# Patient Record
Sex: FEMALE | Race: BLACK OR AFRICAN AMERICAN | NOT HISPANIC OR LATINO | Employment: OTHER | URBAN - METROPOLITAN AREA
[De-identification: names, ages, dates, MRNs, and addresses within clinical notes are randomized per-mention and may not be internally consistent; named-entity substitution may affect disease eponyms.]

---

## 2018-06-17 ENCOUNTER — HOSPITAL ENCOUNTER (INPATIENT)
Facility: HOSPITAL | Age: 68
LOS: 2 days | Discharge: HOME | End: 2018-06-19
Attending: EMERGENCY MEDICINE | Admitting: HOSPITALIST

## 2018-06-17 ENCOUNTER — APPOINTMENT (OUTPATIENT)
Dept: GENERAL RADIOLOGY | Facility: HOSPITAL | Age: 68
End: 2018-06-17

## 2018-06-17 DIAGNOSIS — J02.0 STREP PHARYNGITIS: Primary | ICD-10-CM

## 2018-06-17 DIAGNOSIS — E87.1 HYPONATREMIA: ICD-10-CM

## 2018-06-17 DIAGNOSIS — R73.9 HYPERGLYCEMIA: ICD-10-CM

## 2018-06-17 DIAGNOSIS — A41.9 SEPSIS, DUE TO UNSPECIFIED ORGANISM: ICD-10-CM

## 2018-06-17 PROBLEM — E11.65 TYPE 2 DIABETES MELLITUS WITH HYPERGLYCEMIA (HCC): Status: ACTIVE | Noted: 2018-06-17

## 2018-06-17 PROBLEM — E86.0 DEHYDRATION: Status: ACTIVE | Noted: 2018-06-17

## 2018-06-17 PROBLEM — A40.0 SEPSIS DUE TO GROUP A STREPTOCOCCUS (HCC): Status: ACTIVE | Noted: 2018-06-17

## 2018-06-17 PROBLEM — I10 ESSENTIAL HYPERTENSION: Status: ACTIVE | Noted: 2018-06-17

## 2018-06-17 PROBLEM — A40.9 STREPTOCOCCAL SEPSIS (HCC): Status: ACTIVE | Noted: 2018-06-17

## 2018-06-17 LAB
ALBUMIN SERPL-MCNC: 4.5 G/DL (ref 3.5–5.2)
ALBUMIN/GLOB SERPL: 0.9 G/DL
ALP SERPL-CCNC: 92 U/L (ref 39–117)
ALT SERPL W P-5'-P-CCNC: 15 U/L (ref 1–33)
ANION GAP SERPL CALCULATED.3IONS-SCNC: 16 MMOL/L
AST SERPL-CCNC: 13 U/L (ref 1–32)
BACTERIA UR QL AUTO: ABNORMAL /HPF
BASOPHILS # BLD AUTO: 0.02 10*3/MM3 (ref 0–0.2)
BASOPHILS NFR BLD AUTO: 0.1 % (ref 0–1.5)
BILIRUB SERPL-MCNC: 0.4 MG/DL (ref 0.1–1.2)
BILIRUB UR QL STRIP: NEGATIVE
BUN BLD-MCNC: 13 MG/DL (ref 8–23)
BUN/CREAT SERPL: 11.7 (ref 7–25)
CALCIUM SPEC-SCNC: 10.6 MG/DL (ref 8.6–10.5)
CHLORIDE SERPL-SCNC: 82 MMOL/L (ref 98–107)
CLARITY UR: ABNORMAL
CO2 SERPL-SCNC: 24 MMOL/L (ref 22–29)
COLOR UR: ABNORMAL
CREAT BLD-MCNC: 1.11 MG/DL (ref 0.57–1)
D-LACTATE SERPL-SCNC: 1.7 MMOL/L (ref 0.5–2)
DEPRECATED RDW RBC AUTO: 48.2 FL (ref 37–54)
EOSINOPHIL # BLD AUTO: 0 10*3/MM3 (ref 0–0.7)
EOSINOPHIL NFR BLD AUTO: 0 % (ref 0.3–6.2)
ERYTHROCYTE [DISTWIDTH] IN BLOOD BY AUTOMATED COUNT: 15.9 % (ref 11.7–13)
GFR SERPL CREATININE-BSD FRML MDRD: 59 ML/MIN/1.73
GLOBULIN UR ELPH-MCNC: 5.1 GM/DL
GLUCOSE BLD-MCNC: 336 MG/DL (ref 65–99)
GLUCOSE UR STRIP-MCNC: ABNORMAL MG/DL
HCT VFR BLD AUTO: 43.1 % (ref 35.6–45.5)
HGB BLD-MCNC: 14 G/DL (ref 11.9–15.5)
HGB UR QL STRIP.AUTO: NEGATIVE
HOLD SPECIMEN: NORMAL
HOLD SPECIMEN: NORMAL
HYALINE CASTS UR QL AUTO: ABNORMAL /LPF
IMM GRANULOCYTES # BLD: 0.04 10*3/MM3 (ref 0–0.03)
IMM GRANULOCYTES NFR BLD: 0.2 % (ref 0–0.5)
KETONES UR QL STRIP: ABNORMAL
LEUKOCYTE ESTERASE UR QL STRIP.AUTO: ABNORMAL
LYMPHOCYTES # BLD AUTO: 1.4 10*3/MM3 (ref 0.9–4.8)
LYMPHOCYTES NFR BLD AUTO: 7.5 % (ref 19.6–45.3)
MCH RBC QN AUTO: 26.9 PG (ref 26.9–32)
MCHC RBC AUTO-ENTMCNC: 32.5 G/DL (ref 32.4–36.3)
MCV RBC AUTO: 82.9 FL (ref 80.5–98.2)
MONOCYTES # BLD AUTO: 0.64 10*3/MM3 (ref 0.2–1.2)
MONOCYTES NFR BLD AUTO: 3.4 % (ref 5–12)
NEUTROPHILS # BLD AUTO: 16.56 10*3/MM3 (ref 1.9–8.1)
NEUTROPHILS NFR BLD AUTO: 88.8 % (ref 42.7–76)
NITRITE UR QL STRIP: NEGATIVE
OSMOLALITY SERPL: 298 MOSM/KG (ref 280–301)
OSMOLALITY UR: 547 MOSM/KG
PH UR STRIP.AUTO: <=5 [PH] (ref 5–8)
PLATELET # BLD AUTO: 246 10*3/MM3 (ref 140–500)
PMV BLD AUTO: 10.8 FL (ref 6–12)
POTASSIUM BLD-SCNC: 4.7 MMOL/L (ref 3.5–5.2)
PROT SERPL-MCNC: 9.6 G/DL (ref 6–8.5)
PROT UR QL STRIP: ABNORMAL
RBC # BLD AUTO: 5.2 10*6/MM3 (ref 3.9–5.2)
RBC # UR: ABNORMAL /HPF
REF LAB TEST METHOD: ABNORMAL
S PYO AG THROAT QL: POSITIVE
SODIUM BLD-SCNC: 122 MMOL/L (ref 136–145)
SP GR UR STRIP: >=1.03 (ref 1–1.03)
SQUAMOUS #/AREA URNS HPF: ABNORMAL /HPF
TRANS CELLS #/AREA URNS HPF: ABNORMAL /HPF
URATE SERPL-MCNC: 6 MG/DL (ref 2.4–5.7)
UROBILINOGEN UR QL STRIP: ABNORMAL
WBC NRBC COR # BLD: 18.66 10*3/MM3 (ref 4.5–10.7)
WBC UR QL AUTO: ABNORMAL /HPF
WHOLE BLOOD HOLD SPECIMEN: NORMAL
WHOLE BLOOD HOLD SPECIMEN: NORMAL

## 2018-06-17 PROCEDURE — 87880 STREP A ASSAY W/OPTIC: CPT | Performed by: EMERGENCY MEDICINE

## 2018-06-17 PROCEDURE — 87040 BLOOD CULTURE FOR BACTERIA: CPT

## 2018-06-17 PROCEDURE — 71046 X-RAY EXAM CHEST 2 VIEWS: CPT

## 2018-06-17 PROCEDURE — 80053 COMPREHEN METABOLIC PANEL: CPT

## 2018-06-17 PROCEDURE — 25010000002 PIPERACILLIN SOD-TAZOBACTAM PER 1 G: Performed by: EMERGENCY MEDICINE

## 2018-06-17 PROCEDURE — 87486 CHLMYD PNEUM DNA AMP PROBE: CPT | Performed by: INTERNAL MEDICINE

## 2018-06-17 PROCEDURE — 36415 COLL VENOUS BLD VENIPUNCTURE: CPT

## 2018-06-17 PROCEDURE — 83605 ASSAY OF LACTIC ACID: CPT

## 2018-06-17 PROCEDURE — 83036 HEMOGLOBIN GLYCOSYLATED A1C: CPT | Performed by: INTERNAL MEDICINE

## 2018-06-17 PROCEDURE — 83935 ASSAY OF URINE OSMOLALITY: CPT | Performed by: EMERGENCY MEDICINE

## 2018-06-17 PROCEDURE — 83930 ASSAY OF BLOOD OSMOLALITY: CPT | Performed by: EMERGENCY MEDICINE

## 2018-06-17 PROCEDURE — 87633 RESP VIRUS 12-25 TARGETS: CPT | Performed by: INTERNAL MEDICINE

## 2018-06-17 PROCEDURE — 87040 BLOOD CULTURE FOR BACTERIA: CPT | Performed by: EMERGENCY MEDICINE

## 2018-06-17 PROCEDURE — 87086 URINE CULTURE/COLONY COUNT: CPT | Performed by: EMERGENCY MEDICINE

## 2018-06-17 PROCEDURE — 87581 M.PNEUMON DNA AMP PROBE: CPT | Performed by: INTERNAL MEDICINE

## 2018-06-17 PROCEDURE — 99285 EMERGENCY DEPT VISIT HI MDM: CPT

## 2018-06-17 PROCEDURE — 87798 DETECT AGENT NOS DNA AMP: CPT | Performed by: INTERNAL MEDICINE

## 2018-06-17 PROCEDURE — 81001 URINALYSIS AUTO W/SCOPE: CPT | Performed by: EMERGENCY MEDICINE

## 2018-06-17 PROCEDURE — 85025 COMPLETE CBC W/AUTO DIFF WBC: CPT

## 2018-06-17 PROCEDURE — 84550 ASSAY OF BLOOD/URIC ACID: CPT | Performed by: EMERGENCY MEDICINE

## 2018-06-17 RX ORDER — DEXTROSE MONOHYDRATE 25 G/50ML
25 INJECTION, SOLUTION INTRAVENOUS
Status: DISCONTINUED | OUTPATIENT
Start: 2018-06-17 | End: 2018-06-19 | Stop reason: HOSPADM

## 2018-06-17 RX ORDER — ONDANSETRON 2 MG/ML
4 INJECTION INTRAMUSCULAR; INTRAVENOUS EVERY 6 HOURS PRN
Status: DISCONTINUED | OUTPATIENT
Start: 2018-06-17 | End: 2018-06-19 | Stop reason: HOSPADM

## 2018-06-17 RX ORDER — NICOTINE POLACRILEX 4 MG
15 LOZENGE BUCCAL
Status: DISCONTINUED | OUTPATIENT
Start: 2018-06-17 | End: 2018-06-19 | Stop reason: HOSPADM

## 2018-06-17 RX ORDER — SODIUM CHLORIDE 0.9 % (FLUSH) 0.9 %
10 SYRINGE (ML) INJECTION AS NEEDED
Status: DISCONTINUED | OUTPATIENT
Start: 2018-06-17 | End: 2018-06-19 | Stop reason: HOSPADM

## 2018-06-17 RX ORDER — ONDANSETRON 4 MG/1
4 TABLET, FILM COATED ORAL EVERY 6 HOURS PRN
Status: DISCONTINUED | OUTPATIENT
Start: 2018-06-17 | End: 2018-06-19 | Stop reason: HOSPADM

## 2018-06-17 RX ORDER — ACETAMINOPHEN 500 MG
1000 TABLET ORAL ONCE
Status: COMPLETED | OUTPATIENT
Start: 2018-06-17 | End: 2018-06-17

## 2018-06-17 RX ORDER — CEFTRIAXONE SODIUM 2 G/50ML
2 INJECTION, SOLUTION INTRAVENOUS EVERY 24 HOURS
Status: DISCONTINUED | OUTPATIENT
Start: 2018-06-17 | End: 2018-06-19 | Stop reason: HOSPADM

## 2018-06-17 RX ORDER — SODIUM CHLORIDE 0.9 % (FLUSH) 0.9 %
1-10 SYRINGE (ML) INJECTION AS NEEDED
Status: DISCONTINUED | OUTPATIENT
Start: 2018-06-17 | End: 2018-06-19 | Stop reason: HOSPADM

## 2018-06-17 RX ORDER — SODIUM CHLORIDE 9 MG/ML
100 INJECTION, SOLUTION INTRAVENOUS CONTINUOUS
Status: DISCONTINUED | OUTPATIENT
Start: 2018-06-17 | End: 2018-06-19 | Stop reason: HOSPADM

## 2018-06-17 RX ORDER — CALCIUM CARBONATE 200(500)MG
2 TABLET,CHEWABLE ORAL 2 TIMES DAILY PRN
Status: DISCONTINUED | OUTPATIENT
Start: 2018-06-17 | End: 2018-06-19 | Stop reason: HOSPADM

## 2018-06-17 RX ORDER — ACETAMINOPHEN 325 MG/1
650 TABLET ORAL EVERY 4 HOURS PRN
Status: DISCONTINUED | OUTPATIENT
Start: 2018-06-17 | End: 2018-06-19 | Stop reason: HOSPADM

## 2018-06-17 RX ORDER — BISACODYL 5 MG/1
5 TABLET, DELAYED RELEASE ORAL DAILY PRN
Status: DISCONTINUED | OUTPATIENT
Start: 2018-06-17 | End: 2018-06-19 | Stop reason: HOSPADM

## 2018-06-17 RX ORDER — ONDANSETRON 4 MG/1
4 TABLET, ORALLY DISINTEGRATING ORAL EVERY 6 HOURS PRN
Status: DISCONTINUED | OUTPATIENT
Start: 2018-06-17 | End: 2018-06-19 | Stop reason: HOSPADM

## 2018-06-17 RX ADMIN — SODIUM CHLORIDE 500 ML: 9 INJECTION, SOLUTION INTRAVENOUS at 21:33

## 2018-06-17 RX ADMIN — SODIUM CHLORIDE 125 ML/HR: 9 INJECTION, SOLUTION INTRAVENOUS at 22:33

## 2018-06-17 RX ADMIN — ACETAMINOPHEN 1000 MG: 500 TABLET ORAL at 18:42

## 2018-06-17 RX ADMIN — TAZOBACTAM SODIUM AND PIPERACILLIN SODIUM 4.5 G: 500; 4 INJECTION, SOLUTION INTRAVENOUS at 19:57

## 2018-06-17 RX ADMIN — SODIUM CHLORIDE 1000 ML: 9 INJECTION, SOLUTION INTRAVENOUS at 19:43

## 2018-06-17 NOTE — ED PROVIDER NOTES
EMERGENCY DEPARTMENT ENCOUNTER    CHIEF COMPLAINT  Chief Complaint: sore throat, confusion  History given by: patient, daughter  History limited by: nothing  Room Number: 48/48  PMD: Jenna Chu MD      HPI:  Pt is a 68 y.o. female who presents complaining of a sore throat for the last four days. Pt's daughter states that the pt has been fatigued and chills for the last several days. Pt's daughter states that the pt has had memory loss, confusion, generalized weakness and dizziness over the last several days to where she almost fell today.    Duration:  4 days  Onset: gradual  Timing: constant   Location: throat  Quality: sore  Intensity/Severity: moderate   Progression: worsening  Associated Symptoms: fatigue, chills, memory loss, confusion, generalized weakness, dizziness  Aggravating Factors: none  Alleviating Factors: none  Previous Episodes: none mentioned   Treatment before arrival: none    PAST MEDICAL HISTORY  Active Ambulatory Problems     Diagnosis Date Noted   • No Active Ambulatory Problems     Resolved Ambulatory Problems     Diagnosis Date Noted   • No Resolved Ambulatory Problems     Past Medical History:   Diagnosis Date   • Diabetes mellitus    • Hypertension        PAST SURGICAL HISTORY  History reviewed. No pertinent surgical history.    FAMILY HISTORY  History reviewed. No pertinent family history.    SOCIAL HISTORY  Social History     Social History   • Marital status: Single     Spouse name: N/A   • Number of children: N/A   • Years of education: N/A     Occupational History   • Not on file.     Social History Main Topics   • Smoking status: Never Smoker   • Smokeless tobacco: Not on file   • Alcohol use No   • Drug use: No   • Sexual activity: Not on file     Other Topics Concern   • Not on file     Social History Narrative   • No narrative on file       ALLERGIES  Patient has no known allergies.    REVIEW OF SYSTEMS  Review of Systems   Constitutional: Positive for chills and fatigue.  Negative for fever.   HENT: Positive for sore throat.    Eyes: Negative.    Respiratory: Positive for cough. Negative for shortness of breath.    Cardiovascular: Negative for chest pain.   Gastrointestinal: Negative for abdominal pain, diarrhea and vomiting.   Genitourinary: Negative for dysuria.   Musculoskeletal: Negative for neck pain.   Skin: Negative for rash.   Allergic/Immunologic: Negative.    Neurological: Positive for dizziness and weakness (generalized). Negative for numbness and headaches.   Hematological: Negative.    Psychiatric/Behavioral: Positive for confusion (per daughter).        Memory loss, per daughter   All other systems reviewed and are negative.      PHYSICAL EXAM  ED Triage Vitals   Temp Heart Rate Resp BP SpO2   06/17/18 1805 06/17/18 1805 06/17/18 1820 06/17/18 1820 06/17/18 1805   (!) 101.1 °F (38.4 °C) (!) 142 19 135/79 95 %      Temp src Heart Rate Source Patient Position BP Location FiO2 (%)   06/17/18 1805 06/17/18 1805 -- -- --   Tympanic Monitor          Physical Exam   Constitutional: She is oriented to person, place, and time. She has a sickly appearance. No distress.   HENT:   Head: Normocephalic and atraumatic.   Mouth/Throat: Oropharyngeal exudate (white), posterior oropharyngeal edema (bilateral, symmetric) and posterior oropharyngeal erythema present.   Eyes: EOM are normal. Pupils are equal, round, and reactive to light.   Neck: Normal range of motion. Neck supple.   Cardiovascular: Regular rhythm and normal heart sounds.  Tachycardia present.    Pulmonary/Chest: Effort normal and breath sounds normal. No respiratory distress.   Pt is actively coughing   Abdominal: Soft. There is no tenderness. There is no rebound and no guarding.   Musculoskeletal: Normal range of motion. She exhibits no edema.   Neurological: She is alert and oriented to person, place, and time. She has normal sensation and normal strength.   Skin: Skin is warm and dry. No rash noted.   Psychiatric: Mood  and affect normal.   Nursing note and vitals reviewed.      LAB RESULTS  Lab Results (last 24 hours)     Procedure Component Value Units Date/Time    Blood Culture - Blood, [[id number]] Collected:  06/17/18 1900    Specimen:  Blood from Arm, Right Updated:  06/17/18 1905    Osmolality, Serum [[id number]]  (Normal) Collected:  06/17/18 1900    Specimen:  Blood Updated:  06/17/18 2004     Osmolality 298 mOsm/kg     CBC & Differential [[id number]] Collected:  06/17/18 1901    Specimen:  Blood Updated:  06/17/18 1914    Narrative:       The following orders were created for panel order CBC & Differential.  Procedure                               Abnormality         Status                     ---------                               -----------         ------                     CBC Auto Differential[[id number]]         Abnormal            Final result                 Please view results for these tests on the individual orders.    Comprehensive Metabolic Panel [[id number]]  (Abnormal) Collected:  06/17/18 1901    Specimen:  Blood Updated:  06/17/18 1932     Glucose 336 (H) mg/dL      BUN 13 mg/dL      Creatinine 1.11 (H) mg/dL      Sodium 122 (L) mmol/L      Potassium 4.7 mmol/L      Chloride 82 (L) mmol/L      CO2 24.0 mmol/L      Calcium 10.6 (H) mg/dL      Total Protein 9.6 (H) g/dL      Albumin 4.50 g/dL      ALT (SGPT) 15 U/L      AST (SGOT) 13 U/L      Alkaline Phosphatase 92 U/L      Total Bilirubin 0.4 mg/dL      eGFR   Amer 59 (L) mL/min/1.73      Globulin 5.1 gm/dL      A/G Ratio 0.9 g/dL      BUN/Creatinine Ratio 11.7     Anion Gap 16.0 mmol/L     Lactic Acid, Plasma [[id number]]  (Normal) Collected:  06/17/18 1901    Specimen:  Blood Updated:  06/17/18 1926     Lactate 1.7 mmol/L     CBC Auto Differential [[id number]]  (Abnormal) Collected:  06/17/18 1901    Specimen:  Blood Updated:  06/17/18 1914     WBC 18.66 (H) 10*3/mm3      RBC 5.20 10*6/mm3      Hemoglobin 14.0 g/dL      Hematocrit 43.1 %      MCV 82.9 fL       MCH 26.9 pg      MCHC 32.5 g/dL      RDW 15.9 (H) %      RDW-SD 48.2 fl      MPV 10.8 fL      Platelets 246 10*3/mm3      Neutrophil % 88.8 (H) %      Lymphocyte % 7.5 (L) %      Monocyte % 3.4 (L) %      Eosinophil % 0.0 (L) %      Basophil % 0.1 %      Immature Grans % 0.2 %      Neutrophils, Absolute 16.56 (H) 10*3/mm3      Lymphocytes, Absolute 1.40 10*3/mm3      Monocytes, Absolute 0.64 10*3/mm3      Eosinophils, Absolute 0.00 10*3/mm3      Basophils, Absolute 0.02 10*3/mm3      Immature Grans, Absolute 0.04 (H) 10*3/mm3     Uric Acid [[id number]]  (Abnormal) Collected:  06/17/18 1901    Specimen:  Blood Updated:  06/17/18 2009     Uric Acid 6.0 (H) mg/dL     Blood Culture - Blood, [[id number]] Collected:  06/17/18 1948    Specimen:  Blood from Hand, Left Updated:  06/17/18 1955    Rapid Strep A Screen - Swab, Throat [[id number]]  (Abnormal) Collected:  06/17/18 1950    Specimen:  Swab from Throat Updated:  06/17/18 2010     Strep A Ag Positive (A)    Urinalysis With / Culture If Indicated - Urine, Clean Catch [[id number]] Collected:  06/17/18 2038    Specimen:  Urine from Urine, Clean Catch Updated:  06/17/18 2046    Osmolality, Urine - Urine, Clean Catch [[id number]] Collected:  06/17/18 2038    Specimen:  Urine from Urine, Clean Catch Updated:  06/17/18 2046    Urinalysis, Microscopic Only - Urine, Clean Catch [[id number]] Collected:  06/17/18 2038    Specimen:  Urine from Urine, Clean Catch Updated:  06/17/18 2051          I ordered the above labs and reviewed the results    PROCEDURES  Procedures      PROGRESS AND CONSULTS      1933- Ordered IVF For hydration. Ordered uric acid, urine osmolality, serum osmolality and UA for further evaluation.    1940- Notified pt and family of the pt's lab results, including the pt's elevated blood glucose and hyponatremia. D/w pt and family that the pt's symptoms are not c/w a stroke since she does not have a focal neurologic deficit. Discussed the plan to admit the pt for  IVF and IV abx. Pt understands and agrees with the plan, all questions answered.    1945- Ordered strep screen for further evaluation. Ordered zosyn for pharyngitis.     2023- Placed call to [facility] for admission.    2032- Rechecked pt. Pt is resting comfortably. Pt's HK=163 and VI=688/75. Notified pt of her additional lab results, including her positive strep screen. Discussed the plan to admit the pt for IV abx and further evaluation. Pt and family agree with the plan and all questions were addressed.    2042- Discussed the pt's case with Dr. Moore ([facility]), who agrees to admit the pt to a telemetry bed.    MEDICAL DECISION MAKING  Results were reviewed/discussed with the patient and they were also made aware of online access. Pt also made aware that some labs, such as cultures, will not be resulted during ER visit and follow up with PMD is necessary.     MDM  Number of Diagnoses or Management Options  Hyperglycemia:   Hyponatremia:   Sepsis, due to unspecified organism:   Strep pharyngitis:      Amount and/or Complexity of Data Reviewed  Clinical lab tests: ordered and reviewed (WBC=18.66)  Decide to obtain previous medical records or to obtain history from someone other than the patient: yes  Obtain history from someone other than the patient: yes (daughter)  Review and summarize past medical records: yes (Pt was last seen in the ED in June 2016 for a lumbar strain.)  Discuss the patient with other providers: yes (D/w Dr. Moore ([facility]))    Patient Progress  Patient progress: improved         DIAGNOSIS  Final diagnoses:   Strep pharyngitis   Hyponatremia   Hyperglycemia   Sepsis, due to unspecified organism       DISPOSITION  ADMISSION    Discussed treatment plan and reason for admission with pt/family and admitting physician.  Pt/family voiced understanding of the plan for admission for further testing/treatment as needed.     Latest Documented Vital Signs:  As of 8:53 PM  BP- 130/75 HR- 115 Temp- 99.4 °F (37.4 °C)  (Oral) O2 sat- 98%    --  Documentation assistance provided by david Chambers for Dr. Villalobos.  Information recorded by the scribe was done at my direction and has been verified and validated by me.     Shirley Chambers  06/17/18 1050       Quincy Villalobos MD  06/17/18 5357

## 2018-06-18 LAB
ANION GAP SERPL CALCULATED.3IONS-SCNC: 12.3 MMOL/L
B PERT DNA SPEC QL NAA+PROBE: NOT DETECTED
BASOPHILS # BLD AUTO: 0.01 10*3/MM3 (ref 0–0.2)
BASOPHILS NFR BLD AUTO: 0.1 % (ref 0–1.5)
BUN BLD-MCNC: 10 MG/DL (ref 8–23)
BUN/CREAT SERPL: 13.7 (ref 7–25)
C PNEUM DNA NPH QL NAA+NON-PROBE: NOT DETECTED
CALCIUM SPEC-SCNC: 9.3 MG/DL (ref 8.6–10.5)
CHLORIDE SERPL-SCNC: 96 MMOL/L (ref 98–107)
CO2 SERPL-SCNC: 23.7 MMOL/L (ref 22–29)
CREAT BLD-MCNC: 0.73 MG/DL (ref 0.57–1)
DEPRECATED RDW RBC AUTO: 49 FL (ref 37–54)
EOSINOPHIL # BLD AUTO: 0.05 10*3/MM3 (ref 0–0.7)
EOSINOPHIL NFR BLD AUTO: 0.4 % (ref 0.3–6.2)
ERYTHROCYTE [DISTWIDTH] IN BLOOD BY AUTOMATED COUNT: 16.1 % (ref 11.7–13)
FLUAV H1 2009 PAND RNA NPH QL NAA+PROBE: NOT DETECTED
FLUAV H1 HA GENE NPH QL NAA+PROBE: NOT DETECTED
FLUAV H3 RNA NPH QL NAA+PROBE: NOT DETECTED
FLUAV SUBTYP SPEC NAA+PROBE: NOT DETECTED
FLUBV RNA ISLT QL NAA+PROBE: NOT DETECTED
GFR SERPL CREATININE-BSD FRML MDRD: 96 ML/MIN/1.73
GLUCOSE BLD-MCNC: 180 MG/DL (ref 65–99)
GLUCOSE BLDC GLUCOMTR-MCNC: 167 MG/DL (ref 70–130)
GLUCOSE BLDC GLUCOMTR-MCNC: 174 MG/DL (ref 70–130)
GLUCOSE BLDC GLUCOMTR-MCNC: 198 MG/DL (ref 70–130)
GLUCOSE BLDC GLUCOMTR-MCNC: 248 MG/DL (ref 70–130)
GLUCOSE BLDC GLUCOMTR-MCNC: 364 MG/DL (ref 70–130)
HADV DNA SPEC NAA+PROBE: NOT DETECTED
HBA1C MFR BLD: 9.67 % (ref 4.8–5.6)
HCOV 229E RNA SPEC QL NAA+PROBE: NOT DETECTED
HCOV HKU1 RNA SPEC QL NAA+PROBE: NOT DETECTED
HCOV NL63 RNA SPEC QL NAA+PROBE: NOT DETECTED
HCOV OC43 RNA SPEC QL NAA+PROBE: NOT DETECTED
HCT VFR BLD AUTO: 35.5 % (ref 35.6–45.5)
HGB BLD-MCNC: 11 G/DL (ref 11.9–15.5)
HMPV RNA NPH QL NAA+NON-PROBE: NOT DETECTED
HPIV1 RNA SPEC QL NAA+PROBE: NOT DETECTED
HPIV2 RNA SPEC QL NAA+PROBE: NOT DETECTED
HPIV3 RNA NPH QL NAA+PROBE: NOT DETECTED
HPIV4 P GENE NPH QL NAA+PROBE: NOT DETECTED
IMM GRANULOCYTES # BLD: 0.02 10*3/MM3 (ref 0–0.03)
IMM GRANULOCYTES NFR BLD: 0.2 % (ref 0–0.5)
LYMPHOCYTES # BLD AUTO: 1.35 10*3/MM3 (ref 0.9–4.8)
LYMPHOCYTES NFR BLD AUTO: 12.1 % (ref 19.6–45.3)
M PNEUMO IGG SER IA-ACNC: NOT DETECTED
MCH RBC QN AUTO: 25.9 PG (ref 26.9–32)
MCHC RBC AUTO-ENTMCNC: 31 G/DL (ref 32.4–36.3)
MCV RBC AUTO: 83.7 FL (ref 80.5–98.2)
MONOCYTES # BLD AUTO: 0.8 10*3/MM3 (ref 0.2–1.2)
MONOCYTES NFR BLD AUTO: 7.2 % (ref 5–12)
NEUTROPHILS # BLD AUTO: 8.89 10*3/MM3 (ref 1.9–8.1)
NEUTROPHILS NFR BLD AUTO: 80 % (ref 42.7–76)
PLATELET # BLD AUTO: 202 10*3/MM3 (ref 140–500)
PMV BLD AUTO: 10.9 FL (ref 6–12)
POTASSIUM BLD-SCNC: 3.8 MMOL/L (ref 3.5–5.2)
RBC # BLD AUTO: 4.24 10*6/MM3 (ref 3.9–5.2)
RHINOVIRUS RNA SPEC NAA+PROBE: NOT DETECTED
RSV RNA NPH QL NAA+NON-PROBE: NOT DETECTED
SODIUM BLD-SCNC: 132 MMOL/L (ref 136–145)
WBC NRBC COR # BLD: 11.12 10*3/MM3 (ref 4.5–10.7)

## 2018-06-18 PROCEDURE — 63710000001 INSULIN ASPART PER 5 UNITS: Performed by: INTERNAL MEDICINE

## 2018-06-18 PROCEDURE — 80048 BASIC METABOLIC PNL TOTAL CA: CPT | Performed by: INTERNAL MEDICINE

## 2018-06-18 PROCEDURE — 94799 UNLISTED PULMONARY SVC/PX: CPT

## 2018-06-18 PROCEDURE — 25010000003 CEFTRIAXONE PER 250 MG: Performed by: INTERNAL MEDICINE

## 2018-06-18 PROCEDURE — 82962 GLUCOSE BLOOD TEST: CPT

## 2018-06-18 PROCEDURE — 85025 COMPLETE CBC W/AUTO DIFF WBC: CPT | Performed by: INTERNAL MEDICINE

## 2018-06-18 PROCEDURE — 25010000002 ENOXAPARIN PER 10 MG: Performed by: INTERNAL MEDICINE

## 2018-06-18 RX ORDER — VERAPAMIL HYDROCHLORIDE 240 MG/1
240 TABLET, FILM COATED, EXTENDED RELEASE ORAL NIGHTLY
Status: DISCONTINUED | OUTPATIENT
Start: 2018-06-18 | End: 2018-06-19 | Stop reason: HOSPADM

## 2018-06-18 RX ORDER — NITROGLYCERIN 0.4 MG/1
0.4 TABLET SUBLINGUAL
Status: DISCONTINUED | OUTPATIENT
Start: 2018-06-18 | End: 2018-06-19 | Stop reason: HOSPADM

## 2018-06-18 RX ADMIN — INSULIN ASPART 8 UNITS: 100 INJECTION, SOLUTION INTRAVENOUS; SUBCUTANEOUS at 00:44

## 2018-06-18 RX ADMIN — SODIUM CHLORIDE 100 ML/HR: 9 INJECTION, SOLUTION INTRAVENOUS at 18:52

## 2018-06-18 RX ADMIN — ACETAMINOPHEN 650 MG: 325 TABLET, FILM COATED ORAL at 04:36

## 2018-06-18 RX ADMIN — ACETAMINOPHEN 650 MG: 325 TABLET, FILM COATED ORAL at 14:24

## 2018-06-18 RX ADMIN — INSULIN ASPART 4 UNITS: 100 INJECTION, SOLUTION INTRAVENOUS; SUBCUTANEOUS at 21:24

## 2018-06-18 RX ADMIN — CEFTRIAXONE SODIUM 2 G: 2 INJECTION, SOLUTION INTRAVENOUS at 04:00

## 2018-06-18 RX ADMIN — CEFTRIAXONE SODIUM 2 G: 2 INJECTION, SOLUTION INTRAVENOUS at 21:24

## 2018-06-18 RX ADMIN — VERAPAMIL HYDROCHLORIDE 240 MG: 240 TABLET, FILM COATED, EXTENDED RELEASE ORAL at 21:24

## 2018-06-18 RX ADMIN — INSULIN ASPART 2 UNITS: 100 INJECTION, SOLUTION INTRAVENOUS; SUBCUTANEOUS at 08:15

## 2018-06-18 RX ADMIN — INSULIN ASPART 2 UNITS: 100 INJECTION, SOLUTION INTRAVENOUS; SUBCUTANEOUS at 12:21

## 2018-06-18 RX ADMIN — ENOXAPARIN SODIUM 40 MG: 100 INJECTION SUBCUTANEOUS at 08:14

## 2018-06-18 RX ADMIN — INSULIN ASPART 2 UNITS: 100 INJECTION, SOLUTION INTRAVENOUS; SUBCUTANEOUS at 18:52

## 2018-06-18 NOTE — PROGRESS NOTES
Discharge Planning Assessment  [facility]     Patient Name: Ashli Dimas  MRN: [id number]  Today's Date: 6/18/2018    Admit Date: 6/17/2018          Discharge Needs Assessment     Row Name 06/18/18 1302       Living Environment    Lives With child(tosin), adult    Current Living Arrangements home/apartment/condo    Potentially Unsafe Housing Conditions other (see comments)   no concerns     Primary Care Provided by self    Provides Primary Care For no one    Family Caregiver if Needed child(tosin), adult    Quality of Family Relationships helpful;involved;supportive    Able to Return to Prior Arrangements yes       Resource/Environmental Concerns    Resource/Environmental Concerns none    Transportation Concerns car, none       Transition Planning    Patient/Family Anticipates Transition to home with family    Patient/Family Anticipated Services at Transition none       Discharge Needs Assessment    Readmission Within the Last 30 Days no previous admission in last 30 days    Concerns to be Addressed denies needs/concerns at this time;no discharge needs identified    Equipment Currently Used at Home none    Anticipated Changes Related to Illness none    Equipment Needed After Discharge none            Discharge Plan     Row Name 06/18/18 1303       Plan    Plan Home with daughter     Patient/Family in Agreement with Plan yes    Plan Comments CCP met with patient at bedside. CCP role explained and discharge planning discussed. Face sheet verified and IMM noted. Patient's PcP is Dr. Chu. Patient lives with her daughter, with 6 to 8 steps inside her home. Patient does not use any medical equipment and is independent with her ADLS. Patient has not used home health or been to SNF. Patient uses [facility] on [address] any; patient denies having trouble affording her medications and does not have trouble remembering her medications. Patient has transportation home. Patient's daughter is able to assist as needed. CCP follow  for discharge home. Leticia HOLBROOK            Destination     No service coordination in this encounter.      Durable Medical Equipment     No service coordination in this encounter.      Dialysis/Infusion     No service coordination in this encounter.      Home Medical Care     No service coordination in this encounter.      Social Care     No service coordination in this encounter.                Demographic Summary     Row Name 06/18/18 1302       General Information    Admission Type inpatient    Arrived From emergency department    Required Notices Provided Important Message from Medicare    Referral Source admission list    Reason for Consult discharge planning    Preferred Language English     Used During This Interaction no            Functional Status     Row Name 06/18/18 1302       Functional Status    Usual Activity Tolerance good    Current Activity Tolerance good       Functional Status, IADL    Medications independent    Meal Preparation independent    Housekeeping independent    Laundry independent    Shopping independent       Mental Status    General Appearance WDL WDL       Mental Status Summary    Recent Changes in Mental Status/Cognitive Functioning no changes            Psychosocial    No documentation.           Abuse/Neglect    No documentation.           Legal    No documentation.           Substance Abuse    No documentation.           Patient Forms    No documentation.         YUSEF Hawkins

## 2018-06-18 NOTE — PLAN OF CARE
Problem: Patient Care Overview  Goal: Plan of Care Review  Outcome: Ongoing (interventions implemented as appropriate)   06/18/18 1007   Coping/Psychosocial   Plan of Care Reviewed With patient   Plan of Care Review   Progress no change   OTHER   Outcome Summary VSS. Complaints of throat irritation. Family present at bedside. Will CTM.

## 2018-06-18 NOTE — H&P
Name: Ashli Dimas ADMIT: 2018   : 1950  PCP: Jenna Chu MD    MRN: [id number] LOS: 1 days   AGE/SEX: 68 y.o. female  ROOM: [facility]/     Chief Complaint   Patient presents with   • Dizziness     onset yesterday   • Altered Mental Status     onset yesterday   • Fever     over 100 at home   • Sore Throat     onset Thursday       Subjective   Ms. Dimas is a 68 y.o. female with a history of Type 2 DM that presents to [facility] complaining of a sore throat for the past couple of days.  This has been accompanied by a dry cough, fevers, and intermittent confusion (getting her days mixed up).  She denies sick contacts.  She was found to have strep pharyngitis in the ER with dehydration and hyperglycemia.  She states that she has been eating/drinking less than normal.  She takes metformin at home for her DM and states that her BG levels are typically well-controlled when she checks them.      History of Present Illness    Past Medical History:   Diagnosis Date   • Diabetes mellitus    • Hypertension      History reviewed. No pertinent surgical history.  History reviewed. No pertinent family history.  Social History   Substance Use Topics   • Smoking status: Former Smoker     Types: Cigarettes     Quit date:    • Smokeless tobacco: Not on file   • Alcohol use No     Prescriptions Prior to Admission   Medication Sig Dispense Refill Last Dose   • metFORMIN (GLUCOPHAGE) 1000 MG tablet Take 1,000 mg by mouth 2 (two) times a day with meals.      • verapamil SR (CALAN-SR) 240 MG CR tablet Take 240 mg by mouth every night.      • cyclobenzaprine (FLEXERIL) 5 MG tablet Take 5 mg by mouth 3 (three) times a day as needed for muscle spasms.      • HYDROcodone-acetaminophen (NORCO) 5-325 MG per tablet Take 1 tablet by mouth every 6 (six) hours as needed for severe pain (7-10). 15 tablet 0    • ondansetron (ZOFRAN) 4 MG tablet Take 1 tablet by mouth every 8 (eight) hours as needed for nausea or  vomiting. 15 tablet 0      Allergies:  No Known Allergies    Review of Systems   Constitutional: Positive for appetite change (decreased), chills, fatigue and fever.   HENT: Positive for sore throat. Negative for congestion, ear discharge, ear pain, mouth sores, nosebleeds, rhinorrhea, sinus pain, trouble swallowing and voice change.    Eyes: Negative for photophobia and visual disturbance.   Respiratory: Positive for cough. Negative for choking, chest tightness, shortness of breath and wheezing.    Cardiovascular: Negative for chest pain, palpitations and leg swelling.   Gastrointestinal: Negative for abdominal pain, blood in stool, constipation, diarrhea, nausea and vomiting.   Endocrine: Negative for cold intolerance and heat intolerance.   Genitourinary: Positive for frequency. Negative for difficulty urinating, dysuria and hematuria.   Musculoskeletal: Negative for arthralgias, myalgias, neck pain and neck stiffness.   Skin: Negative for pallor and rash.   Neurological: Positive for weakness (generalized). Negative for dizziness, light-headedness and headaches.   Hematological: Negative for adenopathy. Does not bruise/bleed easily.   Psychiatric/Behavioral: Positive for confusion and decreased concentration.        Objective    Vital Signs  Temp:  [98.6 °F (37 °C)-101.4 °F (38.6 °C)] 98.6 °F (37 °C)  Heart Rate:  [105-142] 105  Resp:  [18-19] 18  BP: (130-140)/(71-96) 133/71  SpO2:  [95 %-99 %] 96 %  on   ;   Device (Oxygen Therapy): room air  Body mass index is 34.33 kg/m².    Physical Exam   Constitutional: She is oriented to person, place, and time. No distress.   HENT:   Head: Normocephalic and atraumatic.   Mouth/Throat: Mucous membranes are dry. Posterior oropharyngeal erythema present.   Eyes: Conjunctivae and EOM are normal. Pupils are equal, round, and reactive to light.   Neck: Normal range of motion. Neck supple.   Cardiovascular: Regular rhythm and intact distal pulses.  Tachycardia present.     Pulmonary/Chest: Effort normal and breath sounds normal.   Abdominal: Soft. Bowel sounds are normal. There is no tenderness.   Musculoskeletal: She exhibits no edema or tenderness.   Neurological: She is alert and oriented to person, place, and time.   Skin: Skin is warm and dry. She is not diaphoretic.   Psychiatric: She has a normal mood and affect. Her behavior is normal.   Nursing note and vitals reviewed.      Results Review:   I reviewed the patient's new clinical results including all labs and xray's.    Lab Results (last 24 hours)     Procedure Component Value Units Date/Time    Blood Culture - Blood, [[id number]] Collected:  06/17/18 1900    Specimen:  Blood from Arm, Right Updated:  06/17/18 1905    Osmolality, Serum [[id number]]  (Normal) Collected:  06/17/18 1900    Specimen:  Blood Updated:  06/17/18 2004     Osmolality 298 mOsm/kg     CBC & Differential [[id number]] Collected:  06/17/18 1901    Specimen:  Blood Updated:  06/17/18 1914    Narrative:       The following orders were created for panel order CBC & Differential.  Procedure                               Abnormality         Status                     ---------                               -----------         ------                     CBC Auto Differential[[id number]]         Abnormal            Final result                 Please view results for these tests on the individual orders.    Comprehensive Metabolic Panel [[id number]]  (Abnormal) Collected:  06/17/18 1901    Specimen:  Blood Updated:  06/17/18 1932     Glucose 336 (H) mg/dL      BUN 13 mg/dL      Creatinine 1.11 (H) mg/dL      Sodium 122 (L) mmol/L      Potassium 4.7 mmol/L      Chloride 82 (L) mmol/L      CO2 24.0 mmol/L      Calcium 10.6 (H) mg/dL      Total Protein 9.6 (H) g/dL      Albumin 4.50 g/dL      ALT (SGPT) 15 U/L      AST (SGOT) 13 U/L      Alkaline Phosphatase 92 U/L      Total Bilirubin 0.4 mg/dL      eGFR   Amer 59 (L) mL/min/1.73      Globulin 5.1 gm/dL      A/G  Ratio 0.9 g/dL      BUN/Creatinine Ratio 11.7     Anion Gap 16.0 mmol/L     Lactic Acid, Plasma [[id number]]  (Normal) Collected:  06/17/18 1901    Specimen:  Blood Updated:  06/17/18 1926     Lactate 1.7 mmol/L     CBC Auto Differential [[id number]]  (Abnormal) Collected:  06/17/18 1901    Specimen:  Blood Updated:  06/17/18 1914     WBC 18.66 (H) 10*3/mm3      RBC 5.20 10*6/mm3      Hemoglobin 14.0 g/dL      Hematocrit 43.1 %      MCV 82.9 fL      MCH 26.9 pg      MCHC 32.5 g/dL      RDW 15.9 (H) %      RDW-SD 48.2 fl      MPV 10.8 fL      Platelets 246 10*3/mm3      Neutrophil % 88.8 (H) %      Lymphocyte % 7.5 (L) %      Monocyte % 3.4 (L) %      Eosinophil % 0.0 (L) %      Basophil % 0.1 %      Immature Grans % 0.2 %      Neutrophils, Absolute 16.56 (H) 10*3/mm3      Lymphocytes, Absolute 1.40 10*3/mm3      Monocytes, Absolute 0.64 10*3/mm3      Eosinophils, Absolute 0.00 10*3/mm3      Basophils, Absolute 0.02 10*3/mm3      Immature Grans, Absolute 0.04 (H) 10*3/mm3     Uric Acid [[id number]]  (Abnormal) Collected:  06/17/18 1901    Specimen:  Blood Updated:  06/17/18 2009     Uric Acid 6.0 (H) mg/dL     Blood Culture - Blood, [[id number]] Collected:  06/17/18 1948    Specimen:  Blood from Hand, Left Updated:  06/17/18 1955    Rapid Strep A Screen - Swab, Throat [[id number]]  (Abnormal) Collected:  06/17/18 1950    Specimen:  Swab from Throat Updated:  06/17/18 2010     Strep A Ag Positive (A)    Urinalysis With / Culture If Indicated - Urine, Clean Catch [[id number]]  (Abnormal) Collected:  06/17/18 2038    Specimen:  Urine from Urine, Clean Catch Updated:  06/17/18 2101     Color, UA Dark Yellow (A)     Appearance, UA Cloudy (A)     pH, UA <=5.0     Specific Gravity, UA >=1.030     Glucose, UA >=1000 mg/dL (3+) (A)     Ketones, UA Trace (A)     Bilirubin, UA Negative     Blood, UA Negative     Protein, UA >=300 mg/dL (3+) (A)     Leuk Esterase, UA Trace (A)     Nitrite, UA Negative     Urobilinogen, UA 1.0 E.U./dL     Osmolality, Urine - Urine, Clean Catch [[id number]] Collected:  06/17/18 2038    Specimen:  Urine from Urine, Clean Catch Updated:  06/17/18 2057     Osmolality, Urine 547 mOsm/kg     Narrative:       Osmo Normal Reference Ranges:    Random:  mOsm/kg H2O, depending on fluid intake.  Random: >850 mOsm/kg H20, after 12 hour fluid restriction.    24 Hour: 300-900 mOsm/kg H2O.    Urinalysis, Microscopic Only - Urine, Clean Catch [[id number]]  (Abnormal) Collected:  06/17/18 2038    Specimen:  Urine from Urine, Clean Catch Updated:  06/17/18 2118     RBC, UA 0-2 /HPF      WBC, UA 13-20 (A) /HPF      Bacteria, UA None Seen /HPF      Squamous Epithelial Cells, UA 3-6 (A) /HPF      Transitional Epithelial Cells, UA 0-2 /HPF      Hyaline Casts, UA 3-6 /LPF      Methodology Manual Light Microscopy    Urine Culture - Urine, [[id number]] Collected:  06/17/18 2038    Specimen:  Urine from Urine, Clean Catch Updated:  06/17/18 2118    Respiratory Panel, PCR - Swab, Nasopharynx [[id number]]  (Normal) Collected:  06/17/18 2306    Specimen:  Swab from Nasopharynx Updated:  06/18/18 0030     ADENOVIRUS, PCR Not Detected     Coronavirus 229E Not Detected     Coronavirus HKU1 Not Detected     Coronavirus NL63 Not Detected     Coronavirus OC43 Not Detected     Human Metapneumovirus Not Detected     Human Rhinovirus/Enterovirus Not Detected     Influenza B PCR Not Detected     Parainfluenza Virus 1 Not Detected     Parainfluenza Virus 2 Not Detected     Parainfluenza Virus 3 Not Detected     Parainfluenza Virus 4 Not Detected     Bordetella pertussis pcr Not Detected     Influenza A H1 2009 PCR Not Detected     Chlamydophila pneumoniae PCR Not Detected     Mycoplasma pneumo by PCR Not Detected     Influenza A PCR Not Detected     Influenza A H3 Not Detected     Influenza A H1 Not Detected     RSV, PCR Not Detected    POC Glucose Once [[id number]]  (Abnormal) Collected:  06/18/18 0040    Specimen:  Blood Updated:  06/18/18 0040      Glucose 364 (H) mg/dL     Narrative:       Meter: [id number] : [id number] Nirmal GORDILLO          XR Chest PA & Lateral   Preliminary Result   No acute findings.                Assessment/Plan      Active Hospital Problems (** Indicates Principal Problem)    Diagnosis Date Noted   • Strep pharyngitis [J02.0] 06/17/2018   • Sepsis due to group A Streptococcus [A40.0] 06/17/2018   • Type 2 diabetes mellitus with hyperglycemia [E11.65] 06/17/2018   • Essential hypertension [I10] 06/17/2018   • Dehydration [E86.0] 06/17/2018   • Hyponatremia [E87.1] 06/17/2018      Resolved Hospital Problems    Diagnosis Date Noted Date Resolved   No resolved problems to display.   Strep Pharyngitis with Sepsis  - WBC 18k, temp 101.4 on admission  - LA nml, start on IVF  - check blood cx's, urine cx; CXR clear  - given zosyn in ER, will start on rocephin  - had some reported confusion likely associated encephalopathy but mental status is normal on my exam    Type 2 DM  - hold metformin  - cover with ssi  - check A1C    Hyponatremia  - corrected for hyperglycemia sodium is 128  - likely from dehydration  - will monitor with IVF    DVT Prophylaxis  - lovenox    Code Status  - full code.  Healthcare surrogate is her daughter    I discussed the patients findings and my recommendations with patient, family and nursing staff.      Chris Moore MD  [facility]  06/18/18  1:45 AM     This patient was seen by me on 6/17/18.

## 2018-06-18 NOTE — ACP (ADVANCE CARE PLANNING)
Patient requested information regarding Advance Directives.  I provided patient a pamphlet explaining how an AD works.  She said she would read through the material.  I stated if she wanted to complete the AD, I would be glad to come back to complete if for her.

## 2018-06-18 NOTE — PROGRESS NOTES
"   LOS: 1 day   Patient Care Team:  Jenna Chu MD as PCP - General (Internal Medicine)    Chief Complaint: tired    Subjective     Feeling better today. Still very tired. Legs ache a little. Has had a mild HA today. Tolerating diet. Voiding well        Subjective:  Symptoms:  Improved.  She reports malaise, weakness and headache.  No shortness of breath, cough, chest pain, chest pressure, diarrhea or anxiety.    Diet:  Adequate intake.  No nausea or vomiting.    Activity level: Impaired due to weakness.    Pain:  She complains of pain that is mild.  She reports pain is improving.  Pain is well controlled.        History taken from: patient chart family    Objective     Vital Signs  Temp:  [98.4 °F (36.9 °C)-101.4 °F (38.6 °C)] 98.4 °F (36.9 °C)  Heart Rate:  [] 101  Resp:  [18-19] 18  BP: (124-140)/(71-96) 135/74    Objective:  General Appearance:  Comfortable and in no acute distress.    Vital signs: (most recent): Blood pressure 135/74, pulse 101, temperature 98.4 °F (36.9 °C), temperature source Oral, resp. rate 18, height 162.6 cm (64\"), weight 90.7 kg (200 lb), SpO2 98 %.  Vital signs are normal.  No fever.    Output: Producing urine and producing stool.    HEENT: Normal HEENT exam.    Lungs:  Normal effort and normal respiratory rate.  Breath sounds clear to auscultation.    Heart: Normal rate.  Regular rhythm.    Abdomen: Abdomen is soft.  Bowel sounds are normal.   There is no abdominal tenderness.     Extremities: There is no dependent edema.    Pulses: Distal pulses are intact.    Neurological: Patient is alert and oriented to person, place and time.    Pupils:  Pupils are equal, round, and reactive to light.    Skin:  Warm and dry.              Results Review:     I reviewed the patient's new clinical results.  I reviewed the patient's new imaging results and agree with the interpretation.  I reviewed the patient's other test results and agree with the interpretation  Discussed with patient and " family x 3    Medication Review: reviewed    Assessment/Plan     Principal Problem:    Strep pharyngitis  Active Problems:    Sepsis due to group A Streptococcus    Type 2 diabetes mellitus with hyperglycemia    Essential hypertension    Dehydration    Hyponatremia          Plan:   (Continue IV Rocephin for now  Will give more IVFs  WBC down  Cr normalized  Na+ normalized  Continue to hold Metformin until discharge  HgbA1c 9.67--will need to f/u with PCP regarding better glycemic control).       Guy Hercules MD  06/18/18  4:03 PM    Time: 25min

## 2018-06-18 NOTE — PLAN OF CARE
Problem: Activity Intolerance (Adult)  Goal: Identify Related Risk Factors and Signs and Symptoms   06/18/18 0650   Activity Intolerance (Adult)   Related Risk Factors (Activity Intolerance) generalized weakness   Signs and Symptoms (Activity Intolerance) dizziness/faintness

## 2018-06-19 VITALS
HEIGHT: 64 IN | TEMPERATURE: 98.4 F | WEIGHT: 198.85 LBS | OXYGEN SATURATION: 97 % | HEART RATE: 86 BPM | SYSTOLIC BLOOD PRESSURE: 129 MMHG | DIASTOLIC BLOOD PRESSURE: 84 MMHG | BODY MASS INDEX: 33.95 KG/M2 | RESPIRATION RATE: 18 BRPM

## 2018-06-19 LAB
ANION GAP SERPL CALCULATED.3IONS-SCNC: 14.6 MMOL/L
BACTERIA SPEC AEROBE CULT: NORMAL
BASOPHILS # BLD AUTO: 0.02 10*3/MM3 (ref 0–0.2)
BASOPHILS NFR BLD AUTO: 0.3 % (ref 0–1.5)
BUN BLD-MCNC: 7 MG/DL (ref 8–23)
BUN/CREAT SERPL: 9.9 (ref 7–25)
CALCIUM SPEC-SCNC: 8.9 MG/DL (ref 8.6–10.5)
CHLORIDE SERPL-SCNC: 101 MMOL/L (ref 98–107)
CO2 SERPL-SCNC: 23.4 MMOL/L (ref 22–29)
CREAT BLD-MCNC: 0.71 MG/DL (ref 0.57–1)
DEPRECATED RDW RBC AUTO: 49.6 FL (ref 37–54)
EOSINOPHIL # BLD AUTO: 0.35 10*3/MM3 (ref 0–0.7)
EOSINOPHIL NFR BLD AUTO: 6 % (ref 0.3–6.2)
ERYTHROCYTE [DISTWIDTH] IN BLOOD BY AUTOMATED COUNT: 16.3 % (ref 11.7–13)
GFR SERPL CREATININE-BSD FRML MDRD: 100 ML/MIN/1.73
GLUCOSE BLD-MCNC: 215 MG/DL (ref 65–99)
GLUCOSE BLDC GLUCOMTR-MCNC: 147 MG/DL (ref 70–130)
GLUCOSE BLDC GLUCOMTR-MCNC: 206 MG/DL (ref 70–130)
HCT VFR BLD AUTO: 34.1 % (ref 35.6–45.5)
HGB BLD-MCNC: 10.9 G/DL (ref 11.9–15.5)
IMM GRANULOCYTES # BLD: 0.01 10*3/MM3 (ref 0–0.03)
IMM GRANULOCYTES NFR BLD: 0.2 % (ref 0–0.5)
LYMPHOCYTES # BLD AUTO: 1.77 10*3/MM3 (ref 0.9–4.8)
LYMPHOCYTES NFR BLD AUTO: 30.6 % (ref 19.6–45.3)
MCH RBC QN AUTO: 26.4 PG (ref 26.9–32)
MCHC RBC AUTO-ENTMCNC: 32 G/DL (ref 32.4–36.3)
MCV RBC AUTO: 82.6 FL (ref 80.5–98.2)
MONOCYTES # BLD AUTO: 0.69 10*3/MM3 (ref 0.2–1.2)
MONOCYTES NFR BLD AUTO: 11.9 % (ref 5–12)
NEUTROPHILS # BLD AUTO: 2.96 10*3/MM3 (ref 1.9–8.1)
NEUTROPHILS NFR BLD AUTO: 51.2 % (ref 42.7–76)
PLATELET # BLD AUTO: 223 10*3/MM3 (ref 140–500)
PMV BLD AUTO: 11.1 FL (ref 6–12)
POTASSIUM BLD-SCNC: 4.1 MMOL/L (ref 3.5–5.2)
RBC # BLD AUTO: 4.13 10*6/MM3 (ref 3.9–5.2)
SODIUM BLD-SCNC: 139 MMOL/L (ref 136–145)
WBC NRBC COR # BLD: 5.79 10*3/MM3 (ref 4.5–10.7)

## 2018-06-19 PROCEDURE — 25010000002 ENOXAPARIN PER 10 MG: Performed by: INTERNAL MEDICINE

## 2018-06-19 PROCEDURE — 85025 COMPLETE CBC W/AUTO DIFF WBC: CPT | Performed by: INTERNAL MEDICINE

## 2018-06-19 PROCEDURE — 80048 BASIC METABOLIC PNL TOTAL CA: CPT | Performed by: INTERNAL MEDICINE

## 2018-06-19 PROCEDURE — 63710000001 INSULIN ASPART PER 5 UNITS: Performed by: INTERNAL MEDICINE

## 2018-06-19 PROCEDURE — 82962 GLUCOSE BLOOD TEST: CPT

## 2018-06-19 PROCEDURE — 36415 COLL VENOUS BLD VENIPUNCTURE: CPT | Performed by: INTERNAL MEDICINE

## 2018-06-19 RX ORDER — CEFDINIR 300 MG/1
300 CAPSULE ORAL 2 TIMES DAILY
Qty: 20 CAPSULE | Refills: 0 | Status: SHIPPED | OUTPATIENT
Start: 2018-06-19 | End: 2018-06-29

## 2018-06-19 RX ADMIN — ENOXAPARIN SODIUM 40 MG: 100 INJECTION SUBCUTANEOUS at 09:09

## 2018-06-19 RX ADMIN — SODIUM CHLORIDE 100 ML/HR: 9 INJECTION, SOLUTION INTRAVENOUS at 05:45

## 2018-06-19 RX ADMIN — INSULIN ASPART 4 UNITS: 100 INJECTION, SOLUTION INTRAVENOUS; SUBCUTANEOUS at 09:09

## 2018-06-19 NOTE — DISCHARGE SUMMARY
Name: Ashli Dimas  Age: 68 y.o.  Sex: female  :  1950  MRN: [id number]         Primary Care Physician: Jenna Chu MD      Date of Admission:  2018  Date of Discharge:  2018      CHIEF COMPLAINT  Dizziness (onset yesterday); Altered Mental Status (onset yesterday); Fever (over 100 at home); and Sore Throat (onset Thursday)      DISCHARGE DIAGNOSIS  Active Hospital Problems (** Indicates Principal Problem)    Diagnosis Date Noted   • **Strep pharyngitis [J02.0] 2018   • Sepsis due to group A Streptococcus [A40.0] 2018   • Type 2 diabetes mellitus with hyperglycemia [E11.65] 2018   • Essential hypertension [I10] 2018   • Dehydration [E86.0] 2018   • Hyponatremia [E87.1] 2018      Resolved Hospital Problems    Diagnosis Date Noted Date Resolved   No resolved problems to display.       SECONDARY DIAGNOSES  Past Medical History:   Diagnosis Date   • Diabetes mellitus    • Hypertension        CONSULTS   Consult Orders (all)     Start     Ordered    18  Inpatient Consult to Advance Care Planning  Once     Provider:  (Not yet assigned)    18  [facility] (on-call MD unless specified)  Once     Specialty:  Internal Medicine  Provider:  Chris Moore MD    18          PROCEDURES PERFORMED  None        HOSPITAL COURSE  Very pleasant 69yo woman admitted with sepsis due to Group A strep pharyngitis, after presenting to ER with c/o sore throat, dizziness, and fever.  She was dehydrated on admission. With IV Rocephin and IVFs she has improved greatly. Will dc home today on oral Cefdinir. Okay to restart Metformin (covered with SSI while admitted). F/u with PCP in 1 week. Blood cultures NGTD.      PHYSICAL EXAM  Temp:  [98.4 °F (36.9 °C)-98.7 °F (37.1 °C)] 98.4 °F (36.9 °C)  Heart Rate:  [] 86  Resp:  [18] 18  BP: (129-138)/(74-84) 129/84  Body mass index is 34.13 kg/m².  Physical Exam  General Appearance:   Comfortable and in no acute distress.     Output: Producing urine and producing stool.    HEENT: Normal HEENT exam.    Lungs:  Normal effort and normal respiratory rate.  Breath sounds clear to auscultation.    Heart: Normal rate.  Regular rhythm.    Abdomen: Abdomen is soft.  Bowel sounds are normal.   There is no abdominal tenderness.     Extremities: There is no dependent edema.    Pulses: Distal pulses are intact.    Neurological: Patient is alert and oriented to person, place and time.    Pupils:  Pupils are equal, round, and reactive to light.    Skin:  Warm and dry.         CONDITION ON DISCHARGE  Stable.      DISCHARGE DISPOSITION   Home      ALLERGIES  No Known Allergies      DISCHARGE MEDICATIONS     Your medication list      START taking these medications      Instructions Last Dose Given Next Dose Due   cefdinir 300 MG capsule  Commonly known as:  OMNICEF      Take 1 capsule by mouth 2 (Two) Times a Day for 10 days.          CONTINUE taking these medications      Instructions Last Dose Given Next Dose Due   metFORMIN 1000 MG tablet  Commonly known as:  GLUCOPHAGE      Take 1,000 mg by mouth 2 (two) times a day with meals.       verapamil  MG CR tablet  Commonly known as:  CALAN-SR      Take 240 mg by mouth every night.             Where to Get Your Medications      These medications were sent to [facility] [address] [place] [address] - [address]  AT [facility] - [phone]  - [phone]   [facility] [address]    Hours:  24-hours Phone:  [phone]   · cefdinir 300 MG capsule        No future appointments.  Additional Instructions for the Follow-ups that You Need to Schedule     Discharge Follow-up with PCP    As directed      Currently Documented PCP:  Jenna Chu MD  PCP Phone Number:  [phone]    Follow Up Details:  Dr. Chu in 1 week           Follow-up Information     Jenna Chu MD .    Specialty:  Internal Medicine  Why:  Dr. Chu in 1  week  Contact information:  [address]  [address]  [place] [address]  [phone]                   TEST  RESULTS PENDING AT DISCHARGE  None   Order Current Status    Blood Culture - Blood, Preliminary result    Blood Culture - Blood, Preliminary result             CODE STATUS  Code Status and Medical Interventions:   Ordered at: 06/19/18 1005     Level Of Support Discussed With:    Patient     Code Status:    CPR     Medical Interventions (Level of Support Prior to Arrest):    Full           Guy Hercules MD  [facility]  06/19/18  10:50 AM      Time: greater than 30 minutes.

## 2018-06-19 NOTE — PLAN OF CARE
Problem: Fall Risk (Adult)  Goal: Identify Related Risk Factors and Signs and Symptoms  Outcome: Ongoing (interventions implemented as appropriate)    Goal: Absence of Fall  Outcome: Ongoing (interventions implemented as appropriate)      Problem: Patient Care Overview  Goal: Plan of Care Review  Outcome: Ongoing (interventions implemented as appropriate)   06/19/18 0456   Coping/Psychosocial   Plan of Care Reviewed With patient   Plan of Care Review   Progress no change   OTHER   Outcome Summary Pt without c/o other than throat pain from persistent cough. Meds given as per MAR. No falls.      Goal: Individualization and Mutuality  Outcome: Ongoing (interventions implemented as appropriate)      Problem: Activity Intolerance (Adult)  Goal: Identify Related Risk Factors and Signs and Symptoms  Outcome: Ongoing (interventions implemented as appropriate)    Goal: Activity Tolerance  Outcome: Ongoing (interventions implemented as appropriate)    Goal: Effective Energy Conservation Techniques  Outcome: Ongoing (interventions implemented as appropriate)

## 2018-06-22 LAB
BACTERIA SPEC AEROBE CULT: NORMAL
BACTERIA SPEC AEROBE CULT: NORMAL

## 2019-04-17 ENCOUNTER — APPOINTMENT (OUTPATIENT)
Dept: GENERAL RADIOLOGY | Facility: HOSPITAL | Age: 69
End: 2019-04-17

## 2019-04-17 ENCOUNTER — HOSPITAL ENCOUNTER (EMERGENCY)
Facility: HOSPITAL | Age: 69
Discharge: HOME | End: 2019-04-17
Attending: EMERGENCY MEDICINE | Admitting: EMERGENCY MEDICINE

## 2019-04-17 VITALS
TEMPERATURE: 96.5 F | WEIGHT: 185 LBS | BODY MASS INDEX: 31.58 KG/M2 | DIASTOLIC BLOOD PRESSURE: 94 MMHG | RESPIRATION RATE: 16 BRPM | HEIGHT: 64 IN | OXYGEN SATURATION: 97 % | HEART RATE: 73 BPM | SYSTOLIC BLOOD PRESSURE: 138 MMHG

## 2019-04-17 DIAGNOSIS — S40.012A CONTUSION OF LEFT SHOULDER, INITIAL ENCOUNTER: Primary | ICD-10-CM

## 2019-04-17 PROCEDURE — 73030 X-RAY EXAM OF SHOULDER: CPT

## 2019-04-17 PROCEDURE — 99282 EMERGENCY DEPT VISIT SF MDM: CPT

## 2019-10-16 ENCOUNTER — APPOINTMENT (OUTPATIENT)
Dept: GENERAL RADIOLOGY | Facility: HOSPITAL | Age: 69
End: 2019-10-16

## 2019-10-16 ENCOUNTER — HOSPITAL ENCOUNTER (OUTPATIENT)
Facility: HOSPITAL | Age: 69
Setting detail: OBSERVATION
Discharge: HOME | End: 2019-10-17
Attending: EMERGENCY MEDICINE | Admitting: HOSPITALIST

## 2019-10-16 ENCOUNTER — APPOINTMENT (OUTPATIENT)
Dept: CT IMAGING | Facility: HOSPITAL | Age: 69
End: 2019-10-16

## 2019-10-16 DIAGNOSIS — R26.89 BALANCE PROBLEM: Primary | ICD-10-CM

## 2019-10-16 DIAGNOSIS — Z86.39 HISTORY OF DIABETES MELLITUS: ICD-10-CM

## 2019-10-16 DIAGNOSIS — R27.0 ATAXIA: ICD-10-CM

## 2019-10-16 DIAGNOSIS — Z86.73 HISTORY OF CVA (CEREBROVASCULAR ACCIDENT): ICD-10-CM

## 2019-10-16 DIAGNOSIS — Z86.79 HISTORY OF HYPERTENSION: ICD-10-CM

## 2019-10-16 PROBLEM — G45.9 TRANSIENT ISCHEMIC ATTACK (TIA): Status: ACTIVE | Noted: 2019-10-16

## 2019-10-16 LAB
ALBUMIN SERPL-MCNC: 4.2 G/DL (ref 3.5–5.2)
ALBUMIN/GLOB SERPL: 1.1 G/DL
ALP SERPL-CCNC: 78 U/L (ref 39–117)
ALT SERPL W P-5'-P-CCNC: 20 U/L (ref 1–33)
ANION GAP SERPL CALCULATED.3IONS-SCNC: 13.1 MMOL/L (ref 5–15)
AST SERPL-CCNC: 21 U/L (ref 1–32)
BACTERIA UR QL AUTO: ABNORMAL /HPF
BASOPHILS # BLD AUTO: 0.03 10*3/MM3 (ref 0–0.2)
BASOPHILS NFR BLD AUTO: 0.5 % (ref 0–1.5)
BILIRUB SERPL-MCNC: 0.3 MG/DL (ref 0.2–1.2)
BILIRUB UR QL STRIP: NEGATIVE
BUN BLD-MCNC: 10 MG/DL (ref 8–23)
BUN/CREAT SERPL: 13.7 (ref 7–25)
CALCIUM SPEC-SCNC: 9.8 MG/DL (ref 8.6–10.5)
CHLORIDE SERPL-SCNC: 97 MMOL/L (ref 98–107)
CLARITY UR: CLEAR
CO2 SERPL-SCNC: 27.9 MMOL/L (ref 22–29)
COLOR UR: YELLOW
CREAT BLD-MCNC: 0.73 MG/DL (ref 0.57–1)
DEPRECATED RDW RBC AUTO: 44.6 FL (ref 37–54)
EOSINOPHIL # BLD AUTO: 0.02 10*3/MM3 (ref 0–0.4)
EOSINOPHIL NFR BLD AUTO: 0.4 % (ref 0.3–6.2)
ERYTHROCYTE [DISTWIDTH] IN BLOOD BY AUTOMATED COUNT: 15.3 % (ref 12.3–15.4)
GFR SERPL CREATININE-BSD FRML MDRD: 96 ML/MIN/1.73
GLOBULIN UR ELPH-MCNC: 4 GM/DL
GLUCOSE BLD-MCNC: 184 MG/DL (ref 65–99)
GLUCOSE BLDC GLUCOMTR-MCNC: 127 MG/DL (ref 70–130)
GLUCOSE UR STRIP-MCNC: NEGATIVE MG/DL
HCT VFR BLD AUTO: 35.2 % (ref 34–46.6)
HGB BLD-MCNC: 11.3 G/DL (ref 12–15.9)
HGB UR QL STRIP.AUTO: NEGATIVE
HYALINE CASTS UR QL AUTO: ABNORMAL /LPF
IMM GRANULOCYTES # BLD AUTO: 0.02 10*3/MM3 (ref 0–0.05)
IMM GRANULOCYTES NFR BLD AUTO: 0.4 % (ref 0–0.5)
KETONES UR QL STRIP: NEGATIVE
LEUKOCYTE ESTERASE UR QL STRIP.AUTO: ABNORMAL
LYMPHOCYTES # BLD AUTO: 0.93 10*3/MM3 (ref 0.7–3.1)
LYMPHOCYTES NFR BLD AUTO: 16.8 % (ref 19.6–45.3)
MAGNESIUM SERPL-MCNC: 1.9 MG/DL (ref 1.6–2.4)
MCH RBC QN AUTO: 25.9 PG (ref 26.6–33)
MCHC RBC AUTO-ENTMCNC: 32.1 G/DL (ref 31.5–35.7)
MCV RBC AUTO: 80.5 FL (ref 79–97)
MONOCYTES # BLD AUTO: 0.27 10*3/MM3 (ref 0.1–0.9)
MONOCYTES NFR BLD AUTO: 4.9 % (ref 5–12)
NEUTROPHILS # BLD AUTO: 4.27 10*3/MM3 (ref 1.7–7)
NEUTROPHILS NFR BLD AUTO: 77 % (ref 42.7–76)
NITRITE UR QL STRIP: NEGATIVE
NRBC BLD AUTO-RTO: 0 /100 WBC (ref 0–0.2)
PH UR STRIP.AUTO: 6.5 [PH] (ref 5–8)
PLATELET # BLD AUTO: 279 10*3/MM3 (ref 140–450)
PMV BLD AUTO: 10.4 FL (ref 6–12)
POTASSIUM BLD-SCNC: 4.3 MMOL/L (ref 3.5–5.2)
PROT SERPL-MCNC: 8.2 G/DL (ref 6–8.5)
PROT UR QL STRIP: ABNORMAL
RBC # BLD AUTO: 4.37 10*6/MM3 (ref 3.77–5.28)
RBC # UR: ABNORMAL /HPF
REF LAB TEST METHOD: ABNORMAL
SODIUM BLD-SCNC: 138 MMOL/L (ref 136–145)
SP GR UR STRIP: 1.02 (ref 1–1.03)
SQUAMOUS #/AREA URNS HPF: ABNORMAL /HPF
TROPONIN T SERPL-MCNC: <0.01 NG/ML (ref 0–0.03)
TSH SERPL DL<=0.05 MIU/L-ACNC: 0.88 UIU/ML (ref 0.27–4.2)
UROBILINOGEN UR QL STRIP: ABNORMAL
WBC NRBC COR # BLD: 5.54 10*3/MM3 (ref 3.4–10.8)
WBC UR QL AUTO: ABNORMAL /HPF

## 2019-10-16 PROCEDURE — G0378 HOSPITAL OBSERVATION PER HR: HCPCS

## 2019-10-16 PROCEDURE — 93010 ELECTROCARDIOGRAM REPORT: CPT | Performed by: INTERNAL MEDICINE

## 2019-10-16 PROCEDURE — 83735 ASSAY OF MAGNESIUM: CPT | Performed by: EMERGENCY MEDICINE

## 2019-10-16 PROCEDURE — 71045 X-RAY EXAM CHEST 1 VIEW: CPT

## 2019-10-16 PROCEDURE — 84443 ASSAY THYROID STIM HORMONE: CPT | Performed by: EMERGENCY MEDICINE

## 2019-10-16 PROCEDURE — 96361 HYDRATE IV INFUSION ADD-ON: CPT

## 2019-10-16 PROCEDURE — 81001 URINALYSIS AUTO W/SCOPE: CPT | Performed by: EMERGENCY MEDICINE

## 2019-10-16 PROCEDURE — 80053 COMPREHEN METABOLIC PANEL: CPT | Performed by: EMERGENCY MEDICINE

## 2019-10-16 PROCEDURE — 70450 CT HEAD/BRAIN W/O DYE: CPT

## 2019-10-16 PROCEDURE — 99285 EMERGENCY DEPT VISIT HI MDM: CPT

## 2019-10-16 PROCEDURE — 93005 ELECTROCARDIOGRAM TRACING: CPT | Performed by: EMERGENCY MEDICINE

## 2019-10-16 PROCEDURE — 99204 OFFICE O/P NEW MOD 45 MIN: CPT | Performed by: PSYCHIATRY & NEUROLOGY

## 2019-10-16 PROCEDURE — 82962 GLUCOSE BLOOD TEST: CPT

## 2019-10-16 PROCEDURE — 36415 COLL VENOUS BLD VENIPUNCTURE: CPT

## 2019-10-16 PROCEDURE — 85025 COMPLETE CBC W/AUTO DIFF WBC: CPT | Performed by: EMERGENCY MEDICINE

## 2019-10-16 PROCEDURE — 96360 HYDRATION IV INFUSION INIT: CPT

## 2019-10-16 PROCEDURE — 84484 ASSAY OF TROPONIN QUANT: CPT | Performed by: EMERGENCY MEDICINE

## 2019-10-16 RX ORDER — ASPIRIN 325 MG
325 TABLET ORAL ONCE
Status: COMPLETED | OUTPATIENT
Start: 2019-10-16 | End: 2019-10-16

## 2019-10-16 RX ORDER — ASPIRIN 300 MG/1
300 SUPPOSITORY RECTAL DAILY
Status: DISCONTINUED | OUTPATIENT
Start: 2019-10-17 | End: 2019-10-17

## 2019-10-16 RX ORDER — ACETAMINOPHEN 650 MG/1
650 SUPPOSITORY RECTAL EVERY 4 HOURS PRN
Status: DISCONTINUED | OUTPATIENT
Start: 2019-10-16 | End: 2019-10-17

## 2019-10-16 RX ORDER — ONDANSETRON 2 MG/ML
4 INJECTION INTRAMUSCULAR; INTRAVENOUS EVERY 6 HOURS PRN
Status: DISCONTINUED | OUTPATIENT
Start: 2019-10-16 | End: 2019-10-17 | Stop reason: HOSPADM

## 2019-10-16 RX ORDER — ATORVASTATIN CALCIUM 80 MG/1
80 TABLET, FILM COATED ORAL NIGHTLY
Status: DISCONTINUED | OUTPATIENT
Start: 2019-10-16 | End: 2019-10-17

## 2019-10-16 RX ORDER — DEXTROSE MONOHYDRATE 25 G/50ML
25 INJECTION, SOLUTION INTRAVENOUS
Status: DISCONTINUED | OUTPATIENT
Start: 2019-10-16 | End: 2019-10-17 | Stop reason: HOSPADM

## 2019-10-16 RX ORDER — SODIUM CHLORIDE 9 MG/ML
75 INJECTION, SOLUTION INTRAVENOUS CONTINUOUS
Status: DISCONTINUED | OUTPATIENT
Start: 2019-10-16 | End: 2019-10-17

## 2019-10-16 RX ORDER — ASPIRIN 325 MG
325 TABLET ORAL DAILY
Status: DISCONTINUED | OUTPATIENT
Start: 2019-10-17 | End: 2019-10-17

## 2019-10-16 RX ORDER — ACETAMINOPHEN 325 MG/1
650 TABLET ORAL EVERY 4 HOURS PRN
Status: DISCONTINUED | OUTPATIENT
Start: 2019-10-16 | End: 2019-10-17 | Stop reason: HOSPADM

## 2019-10-16 RX ORDER — BISACODYL 10 MG
10 SUPPOSITORY, RECTAL RECTAL DAILY PRN
Status: DISCONTINUED | OUTPATIENT
Start: 2019-10-16 | End: 2019-10-17 | Stop reason: HOSPADM

## 2019-10-16 RX ORDER — NICOTINE POLACRILEX 4 MG
15 LOZENGE BUCCAL
Status: DISCONTINUED | OUTPATIENT
Start: 2019-10-16 | End: 2019-10-17 | Stop reason: HOSPADM

## 2019-10-16 RX ADMIN — ASPIRIN 325 MG: 325 TABLET ORAL at 12:50

## 2019-10-16 RX ADMIN — SODIUM CHLORIDE 75 ML/HR: 9 INJECTION, SOLUTION INTRAVENOUS at 16:31

## 2019-10-16 RX ADMIN — ACETAMINOPHEN 650 MG: 325 TABLET, FILM COATED ORAL at 18:09

## 2019-10-16 RX ADMIN — ATORVASTATIN CALCIUM 80 MG: 80 TABLET, FILM COATED ORAL at 23:23

## 2019-10-16 NOTE — ED PROVIDER NOTES
" EMERGENCY DEPARTMENT ENCOUNTER    Room Number:  N529/1  Date of encounter:  10/16/2019  PCP: Jenna Chu MD  Historian: patient      HPI:  Chief Complaint: \"off balance\"  A complete HPI/ROS/PMH/PSH/SH/FH are unobtainable due to: none    Context: Ashli Dimas is a 69 y.o. female who presents to the ED c/o being \"off balance\", occurring while standing making the pt have to hold onto something, beginning around 0200 this morning. Pt also complains of vomiting (x2) and headache but denies abdominal pain, cough, SOA, visual disturbances and diarrhea. Pt took HTN medication which did not improve her sx. Pt reports hx of TIA and states her sx today feel similar. Past medical hx of DM for which she takes Metformin.       PAST MEDICAL HISTORY  Active Ambulatory Problems     Diagnosis Date Noted   • Strep pharyngitis 2018   • Sepsis due to group A Streptococcus (CMS/[facility]) 2018   • Type 2 diabetes mellitus with hyperglycemia (CMS/[facility]) 2018   • Essential hypertension 2018   • Dehydration 2018   • Hyponatremia 2018     Resolved Ambulatory Problems     Diagnosis Date Noted   • No Resolved Ambulatory Problems     Past Medical History:   Diagnosis Date   • CVA (cerebral vascular accident) (CMS/[facility])    • Diabetes mellitus (CMS/[facility])    • Hypertension          PAST SURGICAL HISTORY  History reviewed. No pertinent surgical history.      FAMILY HISTORY  History reviewed. No pertinent family history.      SOCIAL HISTORY  Social History     Socioeconomic History   • Marital status: Single     Spouse name: Not on file   • Number of children: Not on file   • Years of education: Not on file   • Highest education level: Not on file   Tobacco Use   • Smoking status: Former Smoker     Types: Cigarettes     Last attempt to quit:      Years since quittin.8   Substance and Sexual Activity   • Alcohol use: No   • Drug use: No   • Sexual activity: Defer         ALLERGIES  Patient has no known " allergies.        REVIEW OF SYSTEMS  Review of Systems   All systems reviewed and negative except for those discussed in HPI.       PHYSICAL EXAM    I have reviewed the triage vital signs and nursing notes.    ED Triage Vitals [10/16/19 0935]   Temp Heart Rate Resp BP SpO2   97 °F (36.1 °C) 78 16 -- 100 %      Temp src Heart Rate Source Patient Position BP Location FiO2 (%)   Tympanic -- -- -- --       General: Awake, alert, no acute distress  HEENT: Mucous membranes moist, atraumatic, normocephalic, EOMI  Neck: Full ROM  Pulm: Symmetric, non-labored, lungs CTAB  Cardiovascular: Regular rate and rhythm, normal S1/S2, intact distal pulses  GI: Soft, non-tender, non-distended, no rebound, no guarding, bowel sounds present  MSK: Full ROM, no deformity  Skin: Warm, dry  Neuro: Alert and oriented x 3, GCS 15, moving all extremities, abnormal finger to nose, no focal deficits  Psych: Calm, cooperative    Physical Exam    LAB RESULTS  Recent Results (from the past 24 hour(s))   Comprehensive Metabolic Panel    Collection Time: 10/16/19 10:15 AM   Result Value Ref Range    Glucose 184 (H) 65 - 99 mg/dL    BUN 10 8 - 23 mg/dL    Creatinine 0.73 0.57 - 1.00 mg/dL    Sodium 138 136 - 145 mmol/L    Potassium 4.3 3.5 - 5.2 mmol/L    Chloride 97 (L) 98 - 107 mmol/L    CO2 27.9 22.0 - 29.0 mmol/L    Calcium 9.8 8.6 - 10.5 mg/dL    Total Protein 8.2 6.0 - 8.5 g/dL    Albumin 4.20 3.50 - 5.20 g/dL    ALT (SGPT) 20 1 - 33 U/L    AST (SGOT) 21 1 - 32 U/L    Alkaline Phosphatase 78 39 - 117 U/L    Total Bilirubin 0.3 0.2 - 1.2 mg/dL    eGFR  African Amer 96 >60 mL/min/1.73    Globulin 4.0 gm/dL    A/G Ratio 1.1 g/dL    BUN/Creatinine Ratio 13.7 7.0 - 25.0    Anion Gap 13.1 5.0 - 15.0 mmol/L   Troponin    Collection Time: 10/16/19 10:15 AM   Result Value Ref Range    Troponin T <0.010 0.000 - 0.030 ng/mL   Magnesium    Collection Time: 10/16/19 10:15 AM   Result Value Ref Range    Magnesium 1.9 1.6 - 2.4 mg/dL   TSH    Collection Time:  10/16/19 10:15 AM   Result Value Ref Range    TSH 0.876 0.270 - 4.200 uIU/mL   CBC Auto Differential    Collection Time: 10/16/19 10:15 AM   Result Value Ref Range    WBC 5.54 3.40 - 10.80 10*3/mm3    RBC 4.37 3.77 - 5.28 10*6/mm3    Hemoglobin 11.3 (L) 12.0 - 15.9 g/dL    Hematocrit 35.2 34.0 - 46.6 %    MCV 80.5 79.0 - 97.0 fL    MCH 25.9 (L) 26.6 - 33.0 pg    MCHC 32.1 31.5 - 35.7 g/dL    RDW 15.3 12.3 - 15.4 %    RDW-SD 44.6 37.0 - 54.0 fl    MPV 10.4 6.0 - 12.0 fL    Platelets 279 140 - 450 10*3/mm3    Neutrophil % 77.0 (H) 42.7 - 76.0 %    Lymphocyte % 16.8 (L) 19.6 - 45.3 %    Monocyte % 4.9 (L) 5.0 - 12.0 %    Eosinophil % 0.4 0.3 - 6.2 %    Basophil % 0.5 0.0 - 1.5 %    Immature Grans % 0.4 0.0 - 0.5 %    Neutrophils, Absolute 4.27 1.70 - 7.00 10*3/mm3    Lymphocytes, Absolute 0.93 0.70 - 3.10 10*3/mm3    Monocytes, Absolute 0.27 0.10 - 0.90 10*3/mm3    Eosinophils, Absolute 0.02 0.00 - 0.40 10*3/mm3    Basophils, Absolute 0.03 0.00 - 0.20 10*3/mm3    Immature Grans, Absolute 0.02 0.00 - 0.05 10*3/mm3    nRBC 0.0 0.0 - 0.2 /100 WBC   Urinalysis With Microscopic If Indicated (No Culture) - Urine, Clean Catch    Collection Time: 10/16/19 11:08 AM   Result Value Ref Range    Color, UA Yellow Yellow, Straw    Appearance, UA Clear Clear    pH, UA 6.5 5.0 - 8.0    Specific Gravity, UA 1.021 1.005 - 1.030    Glucose, UA Negative Negative    Ketones, UA Negative Negative    Bilirubin, UA Negative Negative    Blood, UA Negative Negative    Protein, UA 30 mg/dL (1+) (A) Negative    Leuk Esterase, UA Small (1+) (A) Negative    Nitrite, UA Negative Negative    Urobilinogen, UA 0.2 E.U./dL 0.2 - 1.0 E.U./dL   Urinalysis, Microscopic Only - Urine, Clean Catch    Collection Time: 10/16/19 11:08 AM   Result Value Ref Range    RBC, UA 0-2 None Seen, 0-2 /HPF    WBC, UA 13-20 (A) None Seen, 0-2 /HPF    Bacteria, UA 1+ (A) None Seen /HPF    Squamous Epithelial Cells, UA 7-12 (A) None Seen, 0-2 /HPF    Hyaline Casts, UA 3-6  None Seen /LPF    Methodology Manual Light Microscopy        Ordered the above labs and independently reviewed the results.        RADIOLOGY  Ct Head Without Contrast    Result Date: 10/16/2019  CT HEAD WITHOUT CONTRAST  HISTORY: Stroke-like symptoms. Headache, dizziness.  COMPARISON: None.  FINDINGS: The brain and ventricles are symmetrical. There is no evidence of hemorrhage, hydrocephalus or of abnormal extra-axial fluid. No focal area of decreased attenuation to suggest acute infarction is identified. Moderate vascular calcification is noted.      No acute process identified. Further evaluation could be performed with MRI examination of brain as indicated.        Radiation dose reduction techniques were utilized, including automated exposure control and exposure modulation based on body size.       Xr Chest 1 View    Result Date: 10/16/2019  SINGLE VIEW CHEST X-RAY  CLINICAL HISTORY: Dizziness.  The lungs appear fairly well-expanded and appear free of infiltrates. There are no pleural effusions. The cardiomediastinal silhouette is unremarkable.  IMPRESSIONS: No evidence of active disease within the chest.  This report was finalized on 10/16/2019 10:17 AM by Dr. Darell Reyes M.D.        I ordered the above noted radiological studies. Reviewed by me and discussed with radiologist.  See dictation for official radiology interpretation.      PROCEDURES    Procedures    EKG    EKG time: 1018  Rhythm/rate: NSR 73  Normal axis  Normal intervals  No ischemic change  No STEMI     No prior to compare       MEDICATIONS GIVEN IN ER    Medications   sodium chloride 0.9 % infusion (not administered)   aspirin tablet 325 mg (not administered)     Or   aspirin suppository 300 mg (not administered)   atorvastatin (LIPITOR) tablet 80 mg (not administered)   acetaminophen (TYLENOL) tablet 650 mg (not administered)     Or   acetaminophen (TYLENOL) suppository 650 mg (not administered)   ondansetron (ZOFRAN) injection 4 mg (not  administered)   bisacodyl (DULCOLAX) suppository 10 mg (not administered)   dextrose (GLUTOSE) oral gel 15 g (not administered)   dextrose (D50W) 25 g/ 50mL Intravenous Solution 25 g (not administered)   glucagon (human recombinant) (GLUCAGEN DIAGNOSTIC) injection 1 mg (not administered)   insulin lispro (humaLOG) injection 0-9 Units (not administered)   aspirin tablet 325 mg (325 mg Oral Given 10/16/19 1250)         PROGRESS, DATA ANALYSIS, CONSULTS, AND MEDICAL DECISION MAKING    All labs have been independently reviewed by me.  All radiology studies have been reviewed by me and discussed with radiologist dictating the report.   EKG's independently viewed and interpreted by me.  Discussion below represents my analysis of pertinent findings related to patient's condition, differential diagnosis, treatment plan and final disposition.      ED Course as of Oct 16 1610   Wed Oct 16, 2019   1332 Patient arrived today for evaluation of acute dizziness that started this morning.  Dizziness was actually more of a balance issue feeling off balance while walking.  She states that this is a similar sensation to what she had when she had her stroke approximately 10 years ago.  On exam today she does demonstrate some mild ataxia with the upper extremities on finger-to-nose, and does have some evidence of instability with ambulation.  CT scan without any acute concerning findings laboratory work-up was also unremarkable.  However, based on her symptoms and history of similar for stroke, will bring her in for further stroke work-up.  She has been given aspirin and LHA to hospitalize.  She has been updated on the course and plan the need for hospital stay.  [DC]      ED Course User Index  [DC] Ean Mendez MD       8704 ordered EKG, labs and chest XR for further evaluation.    1026 discussed pt case with Dr. Ray, neurology, who agrees to consult.    1219 placed call to A    1233 discussed pt case with Dr. Shah, [facility], who  agrees to admit the pt.     1237 pt rechecked and resting comfortably. Informed pt of plan to admit for stroke workup and further treatment. Pt understands and agrees with the plan. All questions have been answered.      AS OF 4:10 PM VITALS:    BP - 144/76  HR - 76  TEMP - 97.5 °F (36.4 °C) (Oral)  02 SATS - 99%        DIAGNOSIS  Final diagnoses:   Balance problem   Ataxia   History of CVA (cerebrovascular accident)   History of diabetes mellitus   History of hypertension         DISPOSITION  ADMISSION    Discussed treatment plan and reason for admission with pt/family and admitting physician.  Pt/family voiced understanding of the plan for admission for further testing/treatment as needed.             Documentation assistance provided by david Noel for Dr. Mendez.  Information recorded by the scribe was done at my direction and has been verified and validated by me.                  Janina Noel  10/16/19 2635       Ean Mendez MD  10/16/19 6738

## 2019-10-16 NOTE — CONSULTS
Neurology Consult Note    Consult Date: 10/16/2019    Referring MD: Keyon Shah MD    Reason for Consult I have been asked to see the patient in neurological consultation to render advice and opinion regarding acute dizziness    Ashli Dimas is a 69 y.o. female with past medical history of stroke, hypertension, diabetes, no prior history of CAD.  She reports a prior stroke several years ago that started with dizziness for which she was seen at [facility].  She felt that the symptoms that stroke were similar to what happened last night.  She reports waking up at 2 AM with a sensation of disequilibrium and found herself leaning and falling to the right.  She denies any associated vision loss, facial numbness or unilateral weakness.  She was able to walk as long as she held onto things for stability.  She feels her symptoms have been stable since admission without any significant improvement.    Past Medical/Surgical Hx:  Past Medical History:   Diagnosis Date   • CVA (cerebral vascular accident) (CMS/[facility])    • Diabetes mellitus (CMS/[facility])    • Hypertension      History reviewed. No pertinent surgical history.    Medications On Admission  Medications Prior to Admission   Medication Sig Dispense Refill Last Dose   • metFORMIN (GLUCOPHAGE) 1000 MG tablet Take 1,000 mg by mouth 2 (two) times a day with meals.      • verapamil SR (CALAN-SR) 240 MG CR tablet Take 240 mg by mouth every night.          Allergies:  No Known Allergies    Social Hx:  Social History     Socioeconomic History   • Marital status: Single     Spouse name: Not on file   • Number of children: Not on file   • Years of education: Not on file   • Highest education level: Not on file   Tobacco Use   • Smoking status: Former Smoker     Types: Cigarettes     Last attempt to quit: 1998     Years since quittin.8   Substance and Sexual Activity   • Alcohol use: No   • Drug use: No   • Sexual activity: Defer       Family Hx:  History reviewed.  "No pertinent family history.    Review of systems  Constitutional: [No fevers, chills]  Eye: [No recent visual problems, eye discharge]  Respiratory: [No shortness of breath, cough]  Cardiovascular: [No Chest pain, palpitations]  Neurologic: [No weakness, numbness]  Psychiatric: [No anxiety, depression]    All other systems reviewed and are negative    Exam    /76 (BP Location: Left arm, Patient Position: Lying)   Pulse 76   Temp 97.5 °F (36.4 °C) (Oral)   Resp 16   Ht 162.6 cm (64\")   Wt 83.5 kg (184 lb)   SpO2 99%   BMI 31.58 kg/m²   gen: NAD, vitals reviewed  Eyes: fundus sharp with no papilledema or retinal hemorrhages  HEENT: no nuchal rigidity  CVS: RRR, S1, S2  MS: oriented x3, recent/remote memory intact, normal attention/concentration, language intact, no neglect, normal fund of knowledge  CN: visual acuity grossly normal, visual fields full, PERRL, EOMI with horizontal nystagmus to the right, facial sensation equal, no facial droop, hearing symmetric, palate elevates symmetrically, shoulder shrug equal, tongue midline  Motor: 5/5 throughout upp 4+/5 left upper and lower extremity, normal tone, 5/5 right upper and lower extremity, normal tone  Sensation: intact to vibration and temperature throughout  Reflexes: 2+ throughout upper and lower extremities, downgoing plantars  Coordination: no dysmetria with finger to nose bilaterally  Gait: no ataxia, unsteady station    DATA:    Lab Results   Component Value Date    GLUCOSE 184 (H) 10/16/2019    CALCIUM 9.8 10/16/2019     10/16/2019    K 4.3 10/16/2019    CO2 27.9 10/16/2019    CL 97 (L) 10/16/2019    BUN 10 10/16/2019    CREATININE 0.73 10/16/2019    EGFRIFAFRI 96 10/16/2019    BCR 13.7 10/16/2019    ANIONGAP 13.1 10/16/2019     Lab Results   Component Value Date    WBC 5.54 10/16/2019    HGB 11.3 (L) 10/16/2019    HCT 35.2 10/16/2019    MCV 80.5 10/16/2019     10/16/2019     No results found for: LDL  Lab Results   Component Value " Date    HGBA1C 9.67 (H) 06/17/2018     No results found for: INR, PROTIME    Lab review: Hemoglobin 11.3    Imaging review: CT head personally reviewed which shows no acute intracranial abnormality, radiology report reviewed.  MRI brain, MRA head and neck ordered    Diagnoses:  Acute vertigo  Left sided weakness    Comment: history suggestive of vestibular neuropathy however she does have left sided weakness which she was not aware of. Differential includes posterior circulation stroke.    PLAN:  MRI/MRA, 2D echo  Prn meclizine

## 2019-10-16 NOTE — ED NOTES
"Pt assisted and ambulated to bathroom, unsteady gait but pt states \"I want to do it, I can as long as I have something to hold on to\". Pt constantly reaching to hold onto rails. Pt returned to bed.      Ayesha Arzate RN  10/16/19 1115    "

## 2019-10-16 NOTE — PROGRESS NOTES
Discharge Planning Assessment  [facility]     Patient Name: Ashli Dimas  MRN: [id number]  Today's Date: 10/16/2019    Admit Date: 10/16/2019    Discharge Needs Assessment     Row Name 10/16/19 1400       Living Environment    Lives With  child(tosin), adult    Name(s) of Who Lives With Patient  Terese    Current Living Arrangements  home/apartment/condo    Primary Care Provided by  self    Provides Primary Care For  no one    Family Caregiver if Needed  child(tosin), adult    Family Caregiver Names  Terese    Quality of Family Relationships  involved;supportive    Able to Return to Prior Arrangements  yes       Resource/Environmental Concerns    Resource/Environmental Concerns  none    Transportation Concerns  car, none       Transition Planning    Patient/Family Anticipated Services at Transition  none    Transportation Anticipated  car, drives self;family or friend will provide       Discharge Needs Assessment    Readmission Within the Last 30 Days  no previous admission in last 30 days    Concerns to be Addressed  no discharge needs identified;denies needs/concerns at this time    Equipment Currently Used at Home  none    Anticipated Changes Related to Illness  none    Equipment Needed After Discharge  none        Discharge Plan     Row Name 10/16/19 1407       Plan    Plan  Facesheet verified, pt denies any need for DME, continues to work part time. Lives with daughter who assist if needed.    Plan Comments  Role of CCP identified. Pt intends to return home upon discharge. No furhter needs identified at ths time.         Destination      No service coordination in this encounter.      Durable Medical Equipment      No service coordination in this encounter.      Dialysis/Infusion      No service coordination in this encounter.      Home Medical Care      No service coordination in this encounter.      Therapy      No service coordination in this encounter.      Community Resources      No service coordination in  this encounter.          Demographic Summary    No documentation.       Functional Status    No documentation.       Psychosocial    No documentation.       Abuse/Neglect    No documentation.       Legal    No documentation.       Substance Abuse    No documentation.       Patient Forms    No documentation.           Alanna Phelan RN

## 2019-10-16 NOTE — PLAN OF CARE
Problem: Fall Risk (Adult)  Goal: Identify Related Risk Factors and Signs and Symptoms  Outcome: Outcome(s) achieved Date Met: 10/16/19    Goal: Absence of Fall  Outcome: Ongoing (interventions implemented as appropriate)      Problem: Stroke (Ischemic) (Adult)  Goal: Signs and Symptoms of Listed Potential Problems Will be Absent, Minimized or Managed (Stroke)  Outcome: Ongoing (interventions implemented as appropriate)

## 2019-10-16 NOTE — H&P
"    Patient Name:  Ashli Dimas  YOB: 1950  MRN:  [id number]  Admit Date:  10/16/2019  Patient Care Team:  Jenna Chu MD as PCP - General (Internal Medicine)      Subjective   History Present Illness     Chief Complaint   Patient presents with   • Dizziness       Ms. Dimas is a 69 y.o. former smoker with a history of diabetes, HTN and prior stroke that presents to [facility] complaining of dizziiness.  Symptoms awoken her from sleep at 2 AM.  She states in 2008 she was told she had \"mini stroke\" that had very similar presentation.  No residual deficits at that time.    Dizziness   This is a new problem. The current episode started today. The problem occurs intermittently. The problem has been unchanged. Associated symptoms include chest pain (\"pressure\"), diaphoresis, headaches, nausea, vertigo and vomiting. Pertinent negatives include no congestion, coughing, fever or urinary symptoms. The symptoms are aggravated by walking. She has tried nothing (took BP med) for the symptoms. The treatment provided no relief.     Review of Systems   Constitutional: Positive for diaphoresis. Negative for fever.   HENT: Negative for congestion, hearing loss and tinnitus.    Eyes: Negative for visual disturbance.   Respiratory: Positive for shortness of breath ([place]). Negative for cough.    Cardiovascular: Positive for chest pain (\"pressure\").   Gastrointestinal: Positive for nausea and vomiting.   Endocrine: Negative.    Genitourinary: Negative.    Musculoskeletal: Positive for gait problem.   Skin: Negative.    Neurological: Positive for dizziness, vertigo and headaches.   Hematological: Negative.    Psychiatric/Behavioral: Negative.         Personal History     Past Medical History:   Diagnosis Date   • Diabetes mellitus (CMS/HCC)    • Hypertension      History reviewed. No pertinent surgical history.  History reviewed. No pertinent family history.  Social History     Tobacco Use   • Smoking " status: Former Smoker     Types: Cigarettes     Last attempt to quit:      Years since quittin.8   Substance Use Topics   • Alcohol use: No   • Drug use: No     No current facility-administered medications on file prior to encounter.      Current Outpatient Medications on File Prior to Encounter   Medication Sig Dispense Refill   • metFORMIN (GLUCOPHAGE) 1000 MG tablet Take 1,000 mg by mouth 2 (two) times a day with meals.     • verapamil SR (CALAN-SR) 240 MG CR tablet Take 240 mg by mouth every night.       No Known Allergies    Objective    Objective     Vital Signs  Temp:  [97 °F (36.1 °C)] 97 °F (36.1 °C)  Heart Rate:  [64-78] 64  Resp:  [16-20] 20  BP: (136-148)/(80-92) 139/86  SpO2:  [98 %-100 %] 99 %  on   ;   Device (Oxygen Therapy): room air  Body mass index is 32.27 kg/m².    Physical Exam   Constitutional: She is oriented to person, place, and time. She appears well-developed and well-nourished. No distress.   HENT:   Head: Normocephalic and atraumatic.   Eyes: Conjunctivae and EOM are normal. No scleral icterus.   Neck: Normal range of motion. No JVD present.   Cardiovascular: Normal rate and regular rhythm.   Pulmonary/Chest: Effort normal and breath sounds normal.   Abdominal: Soft. Bowel sounds are normal. She exhibits no distension. There is no tenderness.   Musculoskeletal: Normal range of motion. She exhibits no edema.   Neurological: She is alert and oriented to person, place, and time.   No nystagmus   Skin: Skin is warm and dry.   Psychiatric: She has a normal mood and affect. Her behavior is normal.   Nursing note and vitals reviewed.      Results Review:  I reviewed the patient's new clinical results.  I reviewed the patient's new imaging results and agree with the interpretation.  I reviewed the patient's other test results and agree with the interpretation  I personally viewed and interpreted the patient's EKG/Telemetry data  Discussed with ED provider.    Lab Results (last 24  hours)     Procedure Component Value Units Date/Time    CBC & Differential [[id number]] Collected:  10/16/19 1015    Specimen:  Blood Updated:  10/16/19 1029    Narrative:       The following orders were created for panel order CBC & Differential.  Procedure                               Abnormality         Status                     ---------                               -----------         ------                     CBC Auto Differential[[id number]]        Abnormal            Final result                 Please view results for these tests on the individual orders.    Comprehensive Metabolic Panel [[id number]]  (Abnormal) Collected:  10/16/19 1015    Specimen:  Blood Updated:  10/16/19 1056     Glucose 184 mg/dL      BUN 10 mg/dL      Creatinine 0.73 mg/dL      Sodium 138 mmol/L      Potassium 4.3 mmol/L      Chloride 97 mmol/L      CO2 27.9 mmol/L      Calcium 9.8 mg/dL      Total Protein 8.2 g/dL      Albumin 4.20 g/dL      ALT (SGPT) 20 U/L      AST (SGOT) 21 U/L      Alkaline Phosphatase 78 U/L      Total Bilirubin 0.3 mg/dL      eGFR  African Amer 96 mL/min/1.73      Globulin 4.0 gm/dL      A/G Ratio 1.1 g/dL      BUN/Creatinine Ratio 13.7     Anion Gap 13.1 mmol/L     Narrative:       GFR Normal >60  Chronic Kidney Disease <60  Kidney Failure <15    Troponin [[id number]]  (Normal) Collected:  10/16/19 1015    Specimen:  Blood Updated:  10/16/19 1058     Troponin T <0.010 ng/mL     Narrative:       Troponin T Reference Range:  <= 0.03 ng/mL-   Negative for AMI  >0.03 ng/mL-     Abnormal for myocardial necrosis.  Clinicians would have to utilize clinical acumen, EKG, Troponin and serial changes to determine if it is an Acute Myocardial Infarction or myocardial injury due to an underlying chronic condition.     Magnesium [[id number]]  (Normal) Collected:  10/16/19 1015    Specimen:  Blood Updated:  10/16/19 1055     Magnesium 1.9 mg/dL     TSH [[id number]]  (Normal) Collected:  10/16/19 1015    Specimen:  Blood  Updated:  10/16/19 1058     TSH 0.876 uIU/mL     CBC Auto Differential [[id number]]  (Abnormal) Collected:  10/16/19 1015    Specimen:  Blood Updated:  10/16/19 1029     WBC 5.54 10*3/mm3      RBC 4.37 10*6/mm3      Hemoglobin 11.3 g/dL      Hematocrit 35.2 %      MCV 80.5 fL      MCH 25.9 pg      MCHC 32.1 g/dL      RDW 15.3 %      RDW-SD 44.6 fl      MPV 10.4 fL      Platelets 279 10*3/mm3      Neutrophil % 77.0 %      Lymphocyte % 16.8 %      Monocyte % 4.9 %      Eosinophil % 0.4 %      Basophil % 0.5 %      Immature Grans % 0.4 %      Neutrophils, Absolute 4.27 10*3/mm3      Lymphocytes, Absolute 0.93 10*3/mm3      Monocytes, Absolute 0.27 10*3/mm3      Eosinophils, Absolute 0.02 10*3/mm3      Basophils, Absolute 0.03 10*3/mm3      Immature Grans, Absolute 0.02 10*3/mm3      nRBC 0.0 /100 WBC     Urinalysis With Microscopic If Indicated (No Culture) - Urine, Clean Catch [[id number]]  (Abnormal) Collected:  10/16/19 1108    Specimen:  Urine, Clean Catch Updated:  10/16/19 1134     Color, UA Yellow     Appearance, UA Clear     pH, UA 6.5     Specific Gravity, UA 1.021     Glucose, UA Negative     Ketones, UA Negative     Bilirubin, UA Negative     Blood, UA Negative     Protein, UA 30 mg/dL (1+)     Leuk Esterase, UA Small (1+)     Nitrite, UA Negative     Urobilinogen, UA 0.2 E.U./dL    Urinalysis, Microscopic Only - Urine, Clean Catch [[id number]]  (Abnormal) Collected:  10/16/19 1108    Specimen:  Urine, Clean Catch Updated:  10/16/19 1152     RBC, UA 0-2 /HPF      WBC, UA 13-20 /HPF      Bacteria, UA 1+ /HPF      Squamous Epithelial Cells, UA 7-12 /HPF      Hyaline Casts, UA 3-6 /LPF      Methodology Manual Light Microscopy          Imaging Results (last 24 hours)     Procedure Component Value Units Date/Time    CT Head Without Contrast [[id number]] Collected:  10/16/19 1214     Updated:  10/16/19 1214    Narrative:       CT HEAD WITHOUT CONTRAST     HISTORY: Stroke-like symptoms. Headache, dizziness.      COMPARISON: None.     FINDINGS: The brain and ventricles are symmetrical. There is no evidence  of hemorrhage, hydrocephalus or of abnormal extra-axial fluid. No focal  area of decreased attenuation to suggest acute infarction is identified.  Moderate vascular calcification is noted.       Impression:       No acute process identified. Further evaluation could be  performed with MRI examination of brain as indicated.                       Radiation dose reduction techniques were utilized, including automated  exposure control and exposure modulation based on body size.          XR Chest 1 View [[id number]] Collected:  10/16/19 1016     Updated:  10/16/19 1020    Narrative:       SINGLE VIEW CHEST X-RAY     CLINICAL HISTORY: Dizziness.     The lungs appear fairly well-expanded and appear free of infiltrates.  There are no pleural effusions. The cardiomediastinal silhouette is  unremarkable.     IMPRESSIONS: No evidence of active disease within the chest.     This report was finalized on 10/16/2019 10:17 AM by Dr. Darell Reyes M.D.                ECG 12 Lead   Preliminary Result   HEART RATE= 73  bpm   RR Interval= 824  ms   SC Interval= 159  ms   P Horizontal Axis= 18  deg   P Front Axis= 45  deg   QRSD Interval= 78  ms   QT Interval= 378  ms   QRS Axis= -4  deg   T Wave Axis= 32  deg   - OTHERWISE NORMAL ECG -   Sinus rhythm   Low voltage, precordial leads   Electronically Signed By:    Date and Time of Study: 2019-10-16 10:18:50           Assessment/Plan     Active Hospital Problems    Diagnosis POA   • **Transient ischemic attack (TIA) [G45.9] Yes   • Balance problem [R26.89] Yes   • Type 2 diabetes mellitus with hyperglycemia (CMS/HCC) [E11.65] Yes   • Essential hypertension [I10] Yes       69 y.o. female admitted with Transient ischemic attack (TIA).    · Dr. Ray recommended admission for stroke work-up.  · Will obtain MRI/MRA as well as echocardiogram.  · She will be seen by the therapy  modalities.  · Monitor blood glucose with correctional factor insulin.    · Hold antihypertensives.  · Normal saline at 75 cc/h.  · SCDs for DVT prophylaxis.  · Full code.  · Discussed with patient and ED provider.      Keyon Shah MD  [facility]  10/16/19  2:17 PM

## 2019-10-17 ENCOUNTER — APPOINTMENT (OUTPATIENT)
Dept: MRI IMAGING | Facility: HOSPITAL | Age: 69
End: 2019-10-17

## 2019-10-17 VITALS
TEMPERATURE: 97.7 F | RESPIRATION RATE: 18 BRPM | HEART RATE: 77 BPM | WEIGHT: 189 LBS | DIASTOLIC BLOOD PRESSURE: 80 MMHG | HEIGHT: 64 IN | BODY MASS INDEX: 32.27 KG/M2 | SYSTOLIC BLOOD PRESSURE: 153 MMHG | OXYGEN SATURATION: 98 %

## 2019-10-17 PROBLEM — H93.3X9: Status: ACTIVE | Noted: 2019-10-16

## 2019-10-17 LAB
ALBUMIN SERPL-MCNC: 3.6 G/DL (ref 3.5–5.2)
ALBUMIN/GLOB SERPL: 1.2 G/DL
ALP SERPL-CCNC: 61 U/L (ref 39–117)
ALT SERPL W P-5'-P-CCNC: 16 U/L (ref 1–33)
ANION GAP SERPL CALCULATED.3IONS-SCNC: 14.5 MMOL/L (ref 5–15)
AST SERPL-CCNC: 17 U/L (ref 1–32)
BILIRUB SERPL-MCNC: 0.2 MG/DL (ref 0.2–1.2)
BUN BLD-MCNC: 9 MG/DL (ref 8–23)
BUN/CREAT SERPL: 12.2 (ref 7–25)
CALCIUM SPEC-SCNC: 8.6 MG/DL (ref 8.6–10.5)
CHLORIDE SERPL-SCNC: 100 MMOL/L (ref 98–107)
CHOLEST SERPL-MCNC: 163 MG/DL (ref 0–200)
CO2 SERPL-SCNC: 22.5 MMOL/L (ref 22–29)
CREAT BLD-MCNC: 0.74 MG/DL (ref 0.57–1)
DEPRECATED RDW RBC AUTO: 46.1 FL (ref 37–54)
ERYTHROCYTE [DISTWIDTH] IN BLOOD BY AUTOMATED COUNT: 15.4 % (ref 12.3–15.4)
GFR SERPL CREATININE-BSD FRML MDRD: 95 ML/MIN/1.73
GLOBULIN UR ELPH-MCNC: 3.1 GM/DL
GLUCOSE BLD-MCNC: 186 MG/DL (ref 65–99)
GLUCOSE BLDC GLUCOMTR-MCNC: 134 MG/DL (ref 70–130)
GLUCOSE BLDC GLUCOMTR-MCNC: 136 MG/DL (ref 70–130)
HBA1C MFR BLD: 7.85 % (ref 4.8–5.6)
HCT VFR BLD AUTO: 33.1 % (ref 34–46.6)
HDLC SERPL-MCNC: 59 MG/DL (ref 40–60)
HGB BLD-MCNC: 10.6 G/DL (ref 12–15.9)
LDLC SERPL CALC-MCNC: 71 MG/DL (ref 0–100)
LDLC/HDLC SERPL: 1.21 {RATIO}
MCH RBC QN AUTO: 26.4 PG (ref 26.6–33)
MCHC RBC AUTO-ENTMCNC: 32 G/DL (ref 31.5–35.7)
MCV RBC AUTO: 82.3 FL (ref 79–97)
PLATELET # BLD AUTO: 261 10*3/MM3 (ref 140–450)
PMV BLD AUTO: 10.3 FL (ref 6–12)
POTASSIUM BLD-SCNC: 3.9 MMOL/L (ref 3.5–5.2)
PROT SERPL-MCNC: 6.7 G/DL (ref 6–8.5)
RBC # BLD AUTO: 4.02 10*6/MM3 (ref 3.77–5.28)
SODIUM BLD-SCNC: 137 MMOL/L (ref 136–145)
TRIGL SERPL-MCNC: 164 MG/DL (ref 0–150)
VIT B12 BLD-MCNC: 510 PG/ML (ref 211–946)
VLDLC SERPL-MCNC: 32.8 MG/DL (ref 5–40)
WBC NRBC COR # BLD: 5.02 10*3/MM3 (ref 3.4–10.8)

## 2019-10-17 PROCEDURE — 80053 COMPREHEN METABOLIC PANEL: CPT | Performed by: HOSPITALIST

## 2019-10-17 PROCEDURE — 70544 MR ANGIOGRAPHY HEAD W/O DYE: CPT

## 2019-10-17 PROCEDURE — 96361 HYDRATE IV INFUSION ADD-ON: CPT

## 2019-10-17 PROCEDURE — 80061 LIPID PANEL: CPT | Performed by: HOSPITALIST

## 2019-10-17 PROCEDURE — 97162 PT EVAL MOD COMPLEX 30 MIN: CPT

## 2019-10-17 PROCEDURE — 70549 MR ANGIOGRAPH NECK W/O&W/DYE: CPT

## 2019-10-17 PROCEDURE — 99214 OFFICE O/P EST MOD 30 MIN: CPT | Performed by: NURSE PRACTITIONER

## 2019-10-17 PROCEDURE — 70553 MRI BRAIN STEM W/O & W/DYE: CPT

## 2019-10-17 PROCEDURE — 83036 HEMOGLOBIN GLYCOSYLATED A1C: CPT | Performed by: HOSPITALIST

## 2019-10-17 PROCEDURE — G0378 HOSPITAL OBSERVATION PER HR: HCPCS

## 2019-10-17 PROCEDURE — 85027 COMPLETE CBC AUTOMATED: CPT | Performed by: HOSPITALIST

## 2019-10-17 PROCEDURE — 0 GADOBENATE DIMEGLUMINE 529 MG/ML SOLUTION: Performed by: HOSPITALIST

## 2019-10-17 PROCEDURE — 82962 GLUCOSE BLOOD TEST: CPT

## 2019-10-17 PROCEDURE — A9577 INJ MULTIHANCE: HCPCS | Performed by: HOSPITALIST

## 2019-10-17 PROCEDURE — 82607 VITAMIN B-12: CPT | Performed by: HOSPITALIST

## 2019-10-17 RX ORDER — ATORVASTATIN CALCIUM 10 MG/1
10 TABLET, FILM COATED ORAL NIGHTLY
Status: DISCONTINUED | OUTPATIENT
Start: 2019-10-17 | End: 2019-10-17 | Stop reason: HOSPADM

## 2019-10-17 RX ORDER — ASPIRIN 81 MG/1
81 TABLET, CHEWABLE ORAL DAILY
Qty: 30 TABLET | Refills: 0 | Status: SHIPPED | OUTPATIENT
Start: 2019-10-18

## 2019-10-17 RX ORDER — MECLIZINE HCL 12.5 MG/1
12.5 TABLET ORAL EVERY 8 HOURS SCHEDULED
Status: DISCONTINUED | OUTPATIENT
Start: 2019-10-17 | End: 2019-10-17 | Stop reason: HOSPADM

## 2019-10-17 RX ORDER — ATORVASTATIN CALCIUM 10 MG/1
10 TABLET, FILM COATED ORAL NIGHTLY
Qty: 30 TABLET | Refills: 0 | Status: SHIPPED | OUTPATIENT
Start: 2019-10-17

## 2019-10-17 RX ORDER — ASPIRIN 81 MG/1
81 TABLET, CHEWABLE ORAL DAILY
Status: DISCONTINUED | OUTPATIENT
Start: 2019-10-18 | End: 2019-10-17 | Stop reason: HOSPADM

## 2019-10-17 RX ORDER — MECLIZINE HCL 12.5 MG/1
12.5 TABLET ORAL 3 TIMES DAILY PRN
Qty: 30 TABLET | Refills: 0 | Status: SHIPPED | OUTPATIENT
Start: 2019-10-17

## 2019-10-17 RX ADMIN — ASPIRIN 325 MG: 325 TABLET ORAL at 08:40

## 2019-10-17 RX ADMIN — GADOBENATE DIMEGLUMINE 17 ML: 529 INJECTION, SOLUTION INTRAVENOUS at 07:28

## 2019-10-17 RX ADMIN — MECLIZINE HYDROCHLORIDE 12.5 MG: 12.5 TABLET, FILM COATED ORAL at 13:30

## 2019-10-17 RX ADMIN — ACETAMINOPHEN 650 MG: 325 TABLET, FILM COATED ORAL at 08:41

## 2019-10-17 NOTE — CONSULTS
"Diabetes Education  Assessment/Teaching    Patient Name:  Ashli Dimas  YOB: 1950  MRN: [id number]  Admit Date:  10/16/2019      Assessment Date:  10/17/2019    Most Recent Value   General Information    Height  162.6 cm (64\")   Height Method  Stated   Weight  85.7 kg (189 lb)   Weight Method  Bed scale   Pregnancy Assessment   Diabetes History   What type of diabetes do you have?  Type 2   Length of Diabetes Diagnosis  6 - 10 years   Current DM knowledge  good   Have you had diabetes education/teaching in the past?  no   Do you test your blood sugar at home?  yes   Frequency of checks  \"every once in a while\"   Who performs the test?  self   Have you had low blood sugar? (<70mg/dl)  no   Have you had high blood sugar? (>140mg/dl)  yes   How often do you have high blood sugar?  occasionally   How would you rate your diabetes control?  good   How often do you check your feet?  never   Education Preferences   Nutrition Information   Assessment Topics   Healthy Eating - Assessment  Needs education   Being Active - Assessment  Needs education   Taking Medication - Assessment  Competent   Problem Solving - Assessment  Needs education   Reducing Risk - Assessment  Needs education   Healthy Coping - Assessment  Competent   Monitoring - Assessment  Competent   DM Goals            Most Recent Value   DM Education Needs   Meter  Has own   Frequency of Testing  Weekly   Medication  Oral [metformin]   Problem Solving  Hypoglycemia, Hyperglycemia, Sick days, Signs, Symptoms, Treatment   Reducing Risks  Foot care, Neuropathy, Eye exam, A1C testing   Physical Activity Frequency  Discussed exercise importance   Healthy Coping  Appropriate   Discharge Plan  Home   Motivation  Moderate   Teaching Method  Explanation, Discussion, Handouts   Patient Response  Verbalized understanding            Other Comments:          Electronically signed by:  Rowan Hernandez RN  10/17/19 11:27 AM  "

## 2019-10-17 NOTE — SIGNIFICANT NOTE
10/17/19 1302   Rehab Time/Intention   Evaluation Not Performed other (see comments)  (talked w. nsg, MRI negative, pt has no current OT needs and dc orders in . DC OT 1257)   Rehab Treatment   Discipline occupational therapist

## 2019-10-17 NOTE — PROGRESS NOTES
"DOS: 10/17/2019  NAME: Ashli Dimas   : 1950  PCP: Jenna Chu MD  Chief Complaint   Patient presents with   • Dizziness     Stroke    Subjective: Patient continues to have dizziness with associated nausea when upright.  Improved with lying back down and being still.  Left sided weakness improving.  No double vision, slurred speech, or headache.  Pt seen in follow up today, however the problem is new to the examiner.      Objective:  Vital signs: /74   Pulse 70   Temp 97.6 °F (36.4 °C) (Oral)   Resp 20   Ht 162.6 cm (64\")   Wt 85.7 kg (189 lb)   SpO2 98%   BMI 32.44 kg/m²       General appearance: Well developed, well nourished, alert and cooperative.   HEENT: Normocephalic.  Arcus senilis.  Neck and spine: Normal range of motion. Normal alignment. No mass or tenderness.    Cardiac: Regular rate and rhythm. No murmurs.   Peripheral Vasculature: Radial pulses are equal and symmetric.  Chest Exam: Clear to auscultation bilaterally, no wheezes, no rhonchi.  Extremities: Normal, no edema.   Skin: No rashes or birthmarks.     Higher integrative function: Oriented to time, place, person, intact recent and remote memory, attention span, concentration and language. Spontaneous speech, fund of vocabulary are normal.  No dysarthria.  CN II: Normal visual fields.   CN III IV VI: Extraocular movements are full.. Pupils are equal, round, and reactive to light.   CN V: Normal facial sensation.  CN VII: Facial movements are symmetric, no weakness.   CN VIII: Auditory acuity is intact to finger rub bilaterally.  CN IX & X: Symmetric palatal movement.   CN XI: Sternocleidomastoid and trapezius are normal. No weakness.   CN XII: The tongue is midline.   Motor: Normal muscle strength in right arm and leg.  Left arm and leg 4+ out of 5 but patient has full strength with encouragement.  No pronator drift.  No fasciculations, rigidity, spasticity or abnormal movements.   Sensation: Normal light touch.  Station " and gait: N/A  Muscle stretch reflexes: Reflexes are normal and symmetric in the upper and lower extremities.   Plantar reflexes are flexor bilaterally.   Coordination: Finger to nose test showed no dysmetria. Rapid alternating movements were normal. Heel to shin normal.     Scheduled Meds:  aspirin 325 mg Oral Daily   Or      aspirin 300 mg Rectal Daily   atorvastatin 80 mg Oral Nightly   insulin lispro 0-9 Units Subcutaneous 4x Daily With Meals & Nightly     Continuous Infusions:  sodium chloride 75 mL/hr Last Rate: Stopped (10/17/19 0701)     PRN Meds:.•  acetaminophen **OR** acetaminophen  •  bisacodyl  •  dextrose  •  dextrose  •  glucagon (human recombinant)  •  ondansetron    Laboratory results:  Lab Results   Component Value Date    GLUCOSE 184 (H) 10/16/2019    CALCIUM 9.8 10/16/2019     10/16/2019    K 4.3 10/16/2019    CO2 27.9 10/16/2019    CL 97 (L) 10/16/2019    BUN 10 10/16/2019    CREATININE 0.73 10/16/2019    EGFRIFAFRI 96 10/16/2019    BCR 13.7 10/16/2019    ANIONGAP 13.1 10/16/2019     Lab Results   Component Value Date    WBC 5.02 10/17/2019    HGB 10.6 (L) 10/17/2019    HCT 33.1 (L) 10/17/2019    MCV 82.3 10/17/2019     10/17/2019     No results found for: CHOL  No results found for: HDL  No results found for: LDL  No results found for: TRIG  EKG 10/16 tracings viewed by me, shows normal sinus rhythm.  Review and interpretation of imaging: MRI brain images reviewed by me, no acute findings  MRI BRAIN WITH AND WITHOUT CONTRAST, MRA HEAD WITHOUT CONTRAST AND MRA  NECK WITH AND WITHOUT CONTRAST     CLINICAL HISTORY: Dizziness and unsteady gait.     MRI BRAIN:   TECHNIQUE: MRI of the brain was obtained with sagittal T1, axial pre and  postgadolinium T1, coronal postgadolinium T1, axial FLAIR, axial T2,  axial diffusion, and axial susceptibility weighted images.     FINDINGS: There are no abnormal areas of restricted diffusion. There are  mild-to-moderate changes of chronic small vessel  ischemic phenomena. The  midline intracranial anatomy is within normal limits. The ventricles,  sulci, and cisterns are age appropriate. A punctate focus of  susceptibility artifact is noted within the left cerebellar hemisphere  compatible with a chronic microhemorrhage which may be due to amyloid  angiopathy. No abnormal areas of contrast enhancement are seen.     IMPRESSION:  No evidence for acute intracranial pathology.     Incidental findings include mild-to-moderate changes of chronic small  vessel ischemic phenomena and a punctate focus of susceptibility  artifact within the left cerebellar hemisphere compatible with a chronic  microhemorrhage which may be due to amyloid angiopathy.     MRA HEAD:  TECHNIQUE: MRI of the head was obtained with 3-D time-of-flight imaging  technique. Maximum intensity projection reconstructed images were  obtained.     FINDINGS: There is normal flow-related enhancement within the vertebral  arteries, the basilar artery, and the posterior cerebral arteries. The  internal carotids, middle cerebrals, and anterior cerebrals also have  normal flow-related enhancement.     IMPRESSION: Unremarkable MRA of the [place].     MRA NECK:  TECHNIQUE: MRA of the neck was obtained with 3-D time-of-flight imaging  technique as well as a contrast-enhanced MRA study. Maximum intensity  projection reconstructed images were obtained.     FINDINGS: There is a classic configuration of the aortic arch. There is  a limited evaluation of the great vessel origins which otherwise appear  unremarkable. The vertebral arteries are tortuous but otherwise  unremarkable as well. There is no significant NASCET stenosis within  either common carotid artery bifurcation or proximal internal carotid  artery.     IMPRESSION: Unremarkable MRA of the neck.     This report was finalized on 10/17/2019 8:07 AM by Dr. Maurisio Wood M.D.       Impression:  Patient is a 61-year-old female with PMH of HTN, DM,  previous tobacco abuse, and prior stroke who presented 10/16 with dizziness similar to her previous stroke.  Patient reported waking up at 2 AM with a sensation of disequilibrium and found herself leaning and falling to the right.  She was not on aspirin or statin prior to arrival.  On physical exam she had some left-sided weakness.    Diagnosis: Vestibular neuropathy  MRI Brain with and without contrast 10/17: No acute findings, mild to moderate changes of chronic small vessel disease noted, chronic microhemorrhage in the left cerebellar hemisphere.  MRA head/neck 10/17: Unremarkable  Labs: TSH 0.876 UA with 1+ protein, 1+ leukocytes.  Hemoglobin A1c 7.8 percent, B12 510, LDL 71    Plan:  Echo note needed, will cancel.  Meclizine 12.5 mg TID for vertigo  Continue low-dose aspirin and statin on discharge for stroke prevention.  Neurochecks  PT following  We will sign off, please call if further questions/concerns.  D/W Dr Ray and Dr Shah today.

## 2019-10-17 NOTE — PLAN OF CARE
Problem: Patient Care Overview  Goal: Plan of Care Review  Outcome: Ongoing (interventions implemented as appropriate)   10/17/19 7183   Coping/Psychosocial   Plan of Care Reviewed With patient   OTHER   Outcome Summary Pt admit for ?TIA with imaging negative for acute process. Pt reports dizziness with mobility. Cervical rotation left/right tolerated with marked increase in dizziness with cervical ext noted today. Pt educated on need to avoid neck movements while up to avoid increased dizziness/unsteadiness. Pt performed required min/cga for amb 40' without AD due to unsteadiness. She was able to amb with rwx and cga for 40'. Pt required cues for safe usage of AD as she frequently let go of it during functional activities: turning to eob, transfers in bathroom. Pt will likley benefit from cont skilled PT for progression toward indep mobility with least vs no AD. Pt educated on up with asst at this time and possibility of need for OPPT for BPV.

## 2019-10-17 NOTE — PLAN OF CARE
Problem: Patient Care Overview  Goal: Plan of Care Review  Outcome: Ongoing (interventions implemented as appropriate)      Problem: Fall Risk (Adult)  Goal: Absence of Fall  Outcome: Ongoing (interventions implemented as appropriate)      Problem: Stroke (Ischemic) (Adult)  Goal: Signs and Symptoms of Listed Potential Problems Will be Absent, Minimized or Managed (Stroke)  Outcome: Ongoing (interventions implemented as appropriate)

## 2019-10-17 NOTE — DISCHARGE SUMMARY
Patient Name: Ashli Dimas  : 1950  MRN: [id number]    Date of Admission: 10/16/2019  Date of Discharge:  10/17/2019  Primary Care Physician: Jenna Chu MD      Chief Complaint:   Dizziness      Discharge Diagnoses     Active Hospital Problems    Diagnosis  POA   • **Vestibular neuropathy [H93.3X9]  Yes   • Balance problem [R26.89]  Yes   • Type 2 diabetes mellitus with hyperglycemia (CMS/HCC) [E11.65]  Yes   • Essential hypertension [I10]  Yes      Resolved Hospital Problems   No resolved problems to display.        Hospital Course     Ms. Dimas is a 69 y.o. female with a history of diabetes and hypertension who presented to [facility] initially complaining of dizziness.  Please see the admitting history and physical for further details.  She was found to have impaired balance and was admitted to the hospital for further evaluation and treatment.  There is concern for possible posterior stroke.  She underwent MRI and MRA which resulted below.  No evidence of stroke.  Neurology thinks this likely represents a vestibular neuropathy.  She been started on meclizine.  Today she feels better but is still slightly dizzy.  She is able to ambulate and will discharge home with a prescription for meclizine.  She will follow-up with neurology outpatient.  Neurology did recommend aspirin and statin at discharge which will be prescribed.      Day of Discharge     Feels better in general.  Able to ambulate today.  Slight dizziness.    Physical Exam:  Temp:  [97.5 °F (36.4 °C)-97.8 °F (36.6 °C)] 97.6 °F (36.4 °C)  Heart Rate:  [64-76] 70  Resp:  [16-20] 20  BP: (132-144)/(72-88) 141/74  Body mass index is 32.44 kg/m².  Physical Exam   Constitutional: She is oriented to person, place, and time. No distress.   Cardiovascular: Normal rate and regular rhythm.   No murmur heard.  Pulmonary/Chest: Effort normal and breath sounds normal.   Abdominal: Soft. Bowel sounds are normal. She exhibits no  distension. There is no tenderness.   Musculoskeletal: Normal range of motion. She exhibits no edema.   Neurological: She is alert and oriented to person, place, and time.   Skin: Skin is warm and dry. She is not diaphoretic.       Consultants     Consulting Physician(s)     Provider Relationship Specialty    Rupert Ray MD Consulting Physician Neurology        Procedures     Imaging Results (all)     Procedure Component Value Units Date/Time    MRI Brain With & Without Contrast [[id number]] Collected:  10/17/19 0806     Updated:  10/17/19 0810    Narrative:       MRI BRAIN WITH AND WITHOUT CONTRAST, MRA HEAD WITHOUT CONTRAST AND MRA  NECK WITH AND WITHOUT CONTRAST     CLINICAL HISTORY: Dizziness and unsteady gait.     MRI BRAIN:   TECHNIQUE: MRI of the brain was obtained with sagittal T1, axial pre and  postgadolinium T1, coronal postgadolinium T1, axial FLAIR, axial T2,  axial diffusion, and axial susceptibility weighted images.     FINDINGS: There are no abnormal areas of restricted diffusion. There are  mild-to-moderate changes of chronic small vessel ischemic phenomena. The  midline intracranial anatomy is within normal limits. The ventricles,  sulci, and cisterns are age appropriate. A punctate focus of  susceptibility artifact is noted within the left cerebellar hemisphere  compatible with a chronic microhemorrhage which may be due to amyloid  angiopathy. No abnormal areas of contrast enhancement are seen.       Impression:       No evidence for acute intracranial pathology.     Incidental findings include mild-to-moderate changes of chronic small  vessel ischemic phenomena and a punctate focus of susceptibility  artifact within the left cerebellar hemisphere compatible with a chronic  microhemorrhage which may be due to amyloid angiopathy.     MRA HEAD:  TECHNIQUE: MRI of the head was obtained with 3-D time-of-flight imaging  technique. Maximum intensity projection reconstructed images  were  obtained.     FINDINGS: There is normal flow-related enhancement within the vertebral  arteries, the basilar artery, and the posterior cerebral arteries. The  internal carotids, middle cerebrals, and anterior cerebrals also have  normal flow-related enhancement.     IMPRESSION: Unremarkable MRA of the [place].     MRA NECK:  TECHNIQUE: MRA of the neck was obtained with 3-D time-of-flight imaging  technique as well as a contrast-enhanced MRA study. Maximum intensity  projection reconstructed images were obtained.     FINDINGS: There is a classic configuration of the aortic arch. There is  a limited evaluation of the great vessel origins which otherwise appear  unremarkable. The vertebral arteries are tortuous but otherwise  unremarkable as well. There is no significant NASCET stenosis within  either common carotid artery bifurcation or proximal internal carotid  artery.     IMPRESSION: Unremarkable MRA of the neck.      MRI Angiogram Head Without Contrast [[id number]] Collected:  10/17/19 0806     Updated:  10/17/19 0810    Narrative:       MRI BRAIN WITH AND WITHOUT CONTRAST, MRA HEAD WITHOUT CONTRAST AND MRA  NECK WITH AND WITHOUT CONTRAST     CLINICAL HISTORY: Dizziness and unsteady gait.     MRI BRAIN:   TECHNIQUE: MRI of the brain was obtained with sagittal T1, axial pre and  postgadolinium T1, coronal postgadolinium T1, axial FLAIR, axial T2,  axial diffusion, and axial susceptibility weighted images.     FINDINGS: There are no abnormal areas of restricted diffusion. There are  mild-to-moderate changes of chronic small vessel ischemic phenomena. The  midline intracranial anatomy is within normal limits. The ventricles,  sulci, and cisterns are age appropriate. A punctate focus of  susceptibility artifact is noted within the left cerebellar hemisphere  compatible with a chronic microhemorrhage which may be due to amyloid  angiopathy. No abnormal areas of contrast enhancement are seen.        Impression:       No evidence for acute intracranial pathology.     Incidental findings include mild-to-moderate changes of chronic small  vessel ischemic phenomena and a punctate focus of susceptibility  artifact within the left cerebellar hemisphere compatible with a chronic  microhemorrhage which may be due to amyloid angiopathy.     MRA HEAD:  TECHNIQUE: MRI of the head was obtained with 3-D time-of-flight imaging  technique. Maximum intensity projection reconstructed images were  obtained.     FINDINGS: There is normal flow-related enhancement within the vertebral  arteries, the basilar artery, and the posterior cerebral arteries. The  internal carotids, middle cerebrals, and anterior cerebrals also have  normal flow-related enhancement.     IMPRESSION: Unremarkable MRA of the [place] of [place].     MRA NECK:  TECHNIQUE: MRA of the neck was obtained with 3-D time-of-flight imaging  technique as well as a contrast-enhanced MRA study. Maximum intensity  projection reconstructed images were obtained.     FINDINGS: There is a classic configuration of the aortic arch. There is  a limited evaluation of the great vessel origins which otherwise appear  unremarkable. The vertebral arteries are tortuous but otherwise  unremarkable as well. There is no significant NASCET stenosis within  either common carotid artery bifurcation or proximal internal carotid  artery.     IMPRESSION: Unremarkable MRA of the neck.       MRI Angiogram Neck With & Without Contrast [[id number]] Collected:  10/17/19 0806     Updated:  10/17/19 0810    Narrative:       MRI BRAIN WITH AND WITHOUT CONTRAST, MRA HEAD WITHOUT CONTRAST AND MRA  NECK WITH AND WITHOUT CONTRAST     CLINICAL HISTORY: Dizziness and unsteady gait.     MRI BRAIN:   TECHNIQUE: MRI of the brain was obtained with sagittal T1, axial pre and  postgadolinium T1, coronal postgadolinium T1, axial FLAIR, axial T2,  axial diffusion, and axial susceptibility weighted images.     FINDINGS:  There are no abnormal areas of restricted diffusion. There are  mild-to-moderate changes of chronic small vessel ischemic phenomena. The  midline intracranial anatomy is within normal limits. The ventricles,  sulci, and cisterns are age appropriate. A punctate focus of  susceptibility artifact is noted within the left cerebellar hemisphere  compatible with a chronic microhemorrhage which may be due to amyloid  angiopathy. No abnormal areas of contrast enhancement are seen.       Impression:       No evidence for acute intracranial pathology.     Incidental findings include mild-to-moderate changes of chronic small  vessel ischemic phenomena and a punctate focus of susceptibility  artifact within the left cerebellar hemisphere compatible with a chronic  microhemorrhage which may be due to amyloid angiopathy.     MRA HEAD:  TECHNIQUE: MRI of the head was obtained with 3-D time-of-flight imaging  technique. Maximum intensity projection reconstructed images were  obtained.     FINDINGS: There is normal flow-related enhancement within the vertebral  arteries, the basilar artery, and the posterior cerebral arteries. The  internal carotids, middle cerebrals, and anterior cerebrals also have  normal flow-related enhancement.     IMPRESSION: Unremarkable MRA of the [place].     MRA NECK:  TECHNIQUE: MRA of the neck was obtained with 3-D time-of-flight imaging  technique as well as a contrast-enhanced MRA study. Maximum intensity  projection reconstructed images were obtained.     FINDINGS: There is a classic configuration of the aortic arch. There is  a limited evaluation of the great vessel origins which otherwise appear  unremarkable. The vertebral arteries are tortuous but otherwise  unremarkable as well. There is no significant NASCET stenosis within  either common carotid artery bifurcation or proximal internal carotid  artery.     IMPRESSION: Unremarkable MRA of the neck.       CT Head Without Contrast  [[id number]] Collected:  10/16/19 1214     Updated:  10/16/19 1753    Narrative:       CT HEAD WITHOUT CONTRAST     HISTORY: Stroke-like symptoms. Headache, dizziness.     COMPARISON: None.     FINDINGS: The brain and ventricles are symmetrical. There is no evidence  of hemorrhage, hydrocephalus or of abnormal extra-axial fluid. No focal  area of decreased attenuation to suggest acute infarction is identified.  Moderate vascular calcification is noted.       Impression:       No acute process identified. Further evaluation could be  performed with MRI examination of brain as indicated.       XR Chest 1 View [[id number]] Collected:  10/16/19 1016     Updated:  10/16/19 1020    Narrative:       SINGLE VIEW CHEST X-RAY     CLINICAL HISTORY: Dizziness.     The lungs appear fairly well-expanded and appear free of infiltrates.  There are no pleural effusions. The cardiomediastinal silhouette is  unremarkable.     IMPRESSIONS: No evidence of active disease within the chest.             Pertinent Labs     Results from last 7 days   Lab Units 10/17/19  0826 10/16/19  1015   WBC 10*3/mm3 5.02 5.54   HEMOGLOBIN g/dL 10.6* 11.3*   PLATELETS 10*3/mm3 261 279     Results from last 7 days   Lab Units 10/17/19  0826 10/16/19  1015   SODIUM mmol/L 137 138   POTASSIUM mmol/L 3.9 4.3   CHLORIDE mmol/L 100 97*   CO2 mmol/L 22.5 27.9   BUN mg/dL 9 10   CREATININE mg/dL 0.74 0.73   GLUCOSE mg/dL 186* 184*   Estimated Creatinine Clearance: 71.3 mL/min (by C-G formula based on SCr of 0.74 mg/dL).  Results from last 7 days   Lab Units 10/17/19  0826 10/16/19  1015   ALBUMIN g/dL 3.60 4.20   BILIRUBIN mg/dL 0.2 0.3   ALK PHOS U/L 61 78   AST (SGOT) U/L 17 21   ALT (SGPT) U/L 16 20     Results from last 7 days   Lab Units 10/17/19  0826 10/16/19  1015   CALCIUM mg/dL 8.6 9.8   ALBUMIN g/dL 3.60 4.20   MAGNESIUM mg/dL  --  1.9       Results from last 7 days   Lab Units 10/16/19  1015   TROPONIN T ng/mL <0.010       Results from last 7 days   Lab  Units 10/17/19  0826   CHOLESTEROL mg/dL 163   TRIGLYCERIDES mg/dL 164*   HDL CHOL mg/dL 59   LDL CHOL mg/dL 71           Test Results Pending at Discharge   None    Discharge Details        Discharge Medications      New Medications      Instructions Start Date   aspirin 81 MG chewable tablet   81 mg, Oral, Daily   Start Date:  10/18/2019     atorvastatin 10 MG tablet  Commonly known as:  LIPITOR   10 mg, Oral, Nightly      meclizine 12.5 MG tablet  Commonly known as:  ANTIVERT   12.5 mg, Oral, 3 Times Daily PRN         Continue These Medications      Instructions Start Date   metFORMIN 1000 MG tablet  Commonly known as:  GLUCOPHAGE   1,000 mg, Oral, 2 Times Daily With Meals      verapamil  MG CR tablet  Commonly known as:  CALAN-SR   240 mg, Oral, Nightly             No Known Allergies      Discharge Disposition:  Home or Self Care    Discharge Diet:  Diet Order   Procedures   • Diet Regular; Thin; Consistent Carbohydrate       Discharge Activity:   Activity Instructions     Activity as Tolerated            CODE STATUS:    Code Status and Medical Interventions:   Ordered at: 10/16/19 1419     Level Of Support Discussed With:    Patient     Code Status:    CPR     Medical Interventions (Level of Support Prior to Arrest):    Full       No future appointments.  Follow-up Information     Jenna Chu MD Follow up in 2 week(s).    Specialty:  Internal Medicine  Contact information:  [address] [place], [place] [address]Y  [place] [address]  [phone]                 Keyon Shah MD  [facility]  10/17/19  12:08 PM

## 2019-10-17 NOTE — THERAPY EVALUATION
Acute Care - Physical Therapy Initial Evaluation  [facility]     Patient Name: Ashli Dimas  : 1950  MRN: [id number]  Today's Date: 10/17/2019                Admit Date: 10/16/2019    Visit Dx:     ICD-10-CM ICD-9-CM   1. Balance problem R26.89 781.99   2. Ataxia R27.0 781.3   3. History of CVA (cerebrovascular accident) Z86.73 V12.54   4. History of diabetes mellitus Z86.39 V12.29   5. History of hypertension Z86.79 V12.59     Patient Active Problem List   Diagnosis   • Strep pharyngitis   • Sepsis due to group A Streptococcus (CMS/HCC)   • Type 2 diabetes mellitus with hyperglycemia (CMS/[facility])   • Essential hypertension   • Dehydration   • Hyponatremia   • Balance problem   • Vestibular neuropathy     Past Medical History:   Diagnosis Date   • CVA (cerebral vascular accident) (CMS/HCC)    • Diabetes mellitus (CMS/HCC)    • Hypertension      History reviewed. No pertinent surgical history.     PT ASSESSMENT (last 12 hours)      Physical Therapy Evaluation    No documentation.       Physical Therapy Education     Title: PT OT SLP Therapies (Done)     Topic: Physical Therapy (Done)     Point: Mobility training (Done)     Learning Progress Summary           Patient Acceptance, E,TB,D, VU,NR by  at 10/17/2019  2:13 PM                   Point: Home exercise program (Done)     Learning Progress Summary           Patient Acceptance, E,TB,D, VU,NR by  at 10/17/2019  2:13 PM                               User Key     Initials Effective Dates Name Provider Type Discipline     18 -  Lydia Wade, PT Physical Therapist PT              PT Recommendation and Plan     Plan of Care Reviewed With: patient  Outcome Summary: Pt admit for ?TIA with imaging negative for acute process. Pt reports dizziness with mobility.  Cervical rotation left/right tolerated with marked increase in dizziness with cervical ext noted today. Pt educated on need to avoid neck movements while up to avoid increased  dizziness/unsteadiness. Pt performed required min/cga for amb 40' without AD due to unsteadiness. She was able to amb with rwx and cga for 40'. Pt required cues for safe usage of AD as she frequently let go of it during functional activities: turning to eob, transfers in bathroom.  Pt will likley benefit from cont skilled PT for progression toward indep mobility with least vs no AD. Pt educated on up with asst at this time and possibility of need for OPPT for BPV.      Time Calculation:   PT Charges     Row Name 10/17/19 1419 10/17/19 1411          Time Calculation    Start Time  0921 -SV 0921 -SV     Stop Time  0948 -SV 0948 -SV     Time Calculation (min)  27 min  -SV  27 min  -SV     PT Received On  --  10/17/19  -SV     PT - Next Appointment  --  10/18/19  -SV     PT Goal Re-Cert Due Date  --  10/24/19  -SV       User Key  (r) = Recorded By, (t) = Taken By, (c) = Cosigned By    Initials Name Provider Type    SV Lydia Wade, PT Physical Therapist        Therapy Charges for Today     Code Description Service Date Service Provider Modifiers Qty    [id number] HC PT EVAL MOD COMPLEXITY 2 10/17/2019 Lydia Wade, PT GP 1          PT G-Codes  Outcome Measure Options: AM-PAC 6 Clicks Basic Mobility (PT)  AM-PAC 6 Clicks Score (PT): 19      Lydia Wade PT  10/17/2019

## 2019-10-18 NOTE — PROGRESS NOTES
Case Management Discharge Note    Final Note: Patient DC'd home            Transportation Services  Private: Car    Final Discharge Disposition Code: 01 - home or self-care

## 2020-09-21 VITALS
DIASTOLIC BLOOD PRESSURE: 92 MMHG | SYSTOLIC BLOOD PRESSURE: 140 MMHG | DIASTOLIC BLOOD PRESSURE: 82 MMHG | DIASTOLIC BLOOD PRESSURE: 85 MMHG | HEART RATE: 94 BPM | DIASTOLIC BLOOD PRESSURE: 91 MMHG | RESPIRATION RATE: 16 BRPM | DIASTOLIC BLOOD PRESSURE: 76 MMHG | OXYGEN SATURATION: 100 % | RESPIRATION RATE: 14 BRPM | HEART RATE: 107 BPM | HEIGHT: 64 IN | TEMPERATURE: 97.4 F | DIASTOLIC BLOOD PRESSURE: 100 MMHG | RESPIRATION RATE: 25 BRPM | TEMPERATURE: 97.5 F | SYSTOLIC BLOOD PRESSURE: 139 MMHG | HEART RATE: 79 BPM | HEART RATE: 82 BPM | HEART RATE: 86 BPM | HEART RATE: 88 BPM | DIASTOLIC BLOOD PRESSURE: 88 MMHG | SYSTOLIC BLOOD PRESSURE: 159 MMHG | OXYGEN SATURATION: 99 % | SYSTOLIC BLOOD PRESSURE: 150 MMHG | HEART RATE: 84 BPM | DIASTOLIC BLOOD PRESSURE: 79 MMHG | DIASTOLIC BLOOD PRESSURE: 87 MMHG | WEIGHT: 195 LBS | OXYGEN SATURATION: 98 % | OXYGEN SATURATION: 97 % | SYSTOLIC BLOOD PRESSURE: 148 MMHG | SYSTOLIC BLOOD PRESSURE: 163 MMHG | SYSTOLIC BLOOD PRESSURE: 151 MMHG | HEART RATE: 92 BPM | SYSTOLIC BLOOD PRESSURE: 142 MMHG

## 2020-09-23 PROBLEM — Z12.11 SCREENING FOR COLONIC NEOPLASIA: Status: ACTIVE | Noted: 2020-09-24

## 2020-09-24 ENCOUNTER — AMBULATORY SURGICAL CENTER (OUTPATIENT)
Dept: URBAN - METROPOLITAN AREA SURGERY 17 | Facility: SURGERY | Age: 70
End: 2020-09-24
Payer: MEDICARE

## 2020-09-24 DIAGNOSIS — K57.30 DIVERTICULOSIS OF LARGE INTESTINE WITHOUT PERFORATION OR ABS: ICD-10-CM

## 2020-09-24 DIAGNOSIS — Z12.11 ENCOUNTER FOR SCREENING FOR MALIGNANT NEOPLASM OF COLON: ICD-10-CM

## 2020-09-24 PROCEDURE — G0121 COLON CA SCRN NOT HI RSK IND: HCPCS | Performed by: INTERNAL MEDICINE

## 2020-09-24 NOTE — SERVICEHPINOTES
71 yo AA female without GI complaints. Family history negative. She presents for a screening colonoscopy.[ROS Wording]

## 2021-03-22 ENCOUNTER — BULK ORDERING (OUTPATIENT)
Dept: CASE MANAGEMENT | Facility: OTHER | Age: 71
End: 2021-03-22

## 2021-03-22 DIAGNOSIS — Z23 IMMUNIZATION DUE: ICD-10-CM

## 2021-10-13 ENCOUNTER — HOSPITAL ENCOUNTER (INPATIENT)
Facility: HOSPITAL | Age: 71
LOS: 2 days | Discharge: HOME | End: 2021-10-15
Attending: EMERGENCY MEDICINE | Admitting: INTERNAL MEDICINE

## 2021-10-13 DIAGNOSIS — I10 ELEVATED BLOOD PRESSURE READING IN OFFICE WITH DIAGNOSIS OF HYPERTENSION: ICD-10-CM

## 2021-10-13 DIAGNOSIS — R73.9 HYPERGLYCEMIA: ICD-10-CM

## 2021-10-13 DIAGNOSIS — G47.33 OSA (OBSTRUCTIVE SLEEP APNEA): ICD-10-CM

## 2021-10-13 DIAGNOSIS — T78.3XXA ANGIO-EDEMA, INITIAL ENCOUNTER: Primary | ICD-10-CM

## 2021-10-13 PROCEDURE — 99285 EMERGENCY DEPT VISIT HI MDM: CPT

## 2021-10-13 PROCEDURE — U0005 INFEC AGEN DETEC AMPLI PROBE: HCPCS | Performed by: EMERGENCY MEDICINE

## 2021-10-13 PROCEDURE — 85025 COMPLETE CBC W/AUTO DIFF WBC: CPT | Performed by: EMERGENCY MEDICINE

## 2021-10-13 PROCEDURE — 80048 BASIC METABOLIC PNL TOTAL CA: CPT | Performed by: EMERGENCY MEDICINE

## 2021-10-13 PROCEDURE — 99284 EMERGENCY DEPT VISIT MOD MDM: CPT

## 2021-10-13 PROCEDURE — U0003 INFECTIOUS AGENT DETECTION BY NUCLEIC ACID (DNA OR RNA); SEVERE ACUTE RESPIRATORY SYNDROME CORONAVIRUS 2 (SARS-COV-2) (CORONAVIRUS DISEASE [COVID-19]), AMPLIFIED PROBE TECHNIQUE, MAKING USE OF HIGH THROUGHPUT TECHNOLOGIES AS DESCRIBED BY CMS-2020-01-R: HCPCS | Performed by: EMERGENCY MEDICINE

## 2021-10-13 RX ORDER — SODIUM CHLORIDE 0.9 % (FLUSH) 0.9 %
10 SYRINGE (ML) INJECTION EVERY 12 HOURS SCHEDULED
Status: DISCONTINUED | OUTPATIENT
Start: 2021-10-13 | End: 2021-10-15 | Stop reason: HOSPADM

## 2021-10-13 RX ORDER — ACETAMINOPHEN 650 MG/1
650 SUPPOSITORY RECTAL EVERY 4 HOURS PRN
Status: DISCONTINUED | OUTPATIENT
Start: 2021-10-13 | End: 2021-10-15 | Stop reason: HOSPADM

## 2021-10-13 RX ORDER — ONDANSETRON 2 MG/ML
4 INJECTION INTRAMUSCULAR; INTRAVENOUS EVERY 6 HOURS PRN
Status: DISCONTINUED | OUTPATIENT
Start: 2021-10-13 | End: 2021-10-15 | Stop reason: HOSPADM

## 2021-10-13 RX ORDER — SODIUM CHLORIDE 0.9 % (FLUSH) 0.9 %
10 SYRINGE (ML) INJECTION AS NEEDED
Status: DISCONTINUED | OUTPATIENT
Start: 2021-10-13 | End: 2021-10-15 | Stop reason: HOSPADM

## 2021-10-13 RX ORDER — ACETAMINOPHEN 325 MG/1
650 TABLET ORAL EVERY 4 HOURS PRN
Status: DISCONTINUED | OUTPATIENT
Start: 2021-10-13 | End: 2021-10-15 | Stop reason: HOSPADM

## 2021-10-13 RX ORDER — FAMOTIDINE 10 MG/ML
20 INJECTION, SOLUTION INTRAVENOUS ONCE
Status: COMPLETED | OUTPATIENT
Start: 2021-10-13 | End: 2021-10-13

## 2021-10-13 RX ORDER — ONDANSETRON 4 MG/1
4 TABLET, FILM COATED ORAL EVERY 6 HOURS PRN
Status: DISCONTINUED | OUTPATIENT
Start: 2021-10-13 | End: 2021-10-15 | Stop reason: HOSPADM

## 2021-10-13 RX ADMIN — FAMOTIDINE 20 MG: 10 INJECTION, SOLUTION INTRAVENOUS at 23:38

## 2021-10-14 LAB
ANION GAP SERPL CALCULATED.3IONS-SCNC: 15.2 MMOL/L (ref 5–15)
ANION GAP SERPL CALCULATED.3IONS-SCNC: 15.5 MMOL/L (ref 5–15)
BASOPHILS # BLD AUTO: 0.03 10*3/MM3 (ref 0–0.2)
BASOPHILS NFR BLD AUTO: 0.2 % (ref 0–1.5)
BUN SERPL-MCNC: 11 MG/DL (ref 8–23)
BUN SERPL-MCNC: 11 MG/DL (ref 8–23)
BUN/CREAT SERPL: 13.9 (ref 7–25)
BUN/CREAT SERPL: 18.6 (ref 7–25)
CALCIUM SPEC-SCNC: 8.4 MG/DL (ref 8.6–10.5)
CALCIUM SPEC-SCNC: 9.4 MG/DL (ref 8.6–10.5)
CHLORIDE SERPL-SCNC: 105 MMOL/L (ref 98–107)
CHLORIDE SERPL-SCNC: 99 MMOL/L (ref 98–107)
CO2 SERPL-SCNC: 18.8 MMOL/L (ref 22–29)
CO2 SERPL-SCNC: 24.5 MMOL/L (ref 22–29)
CREAT SERPL-MCNC: 0.59 MG/DL (ref 0.57–1)
CREAT SERPL-MCNC: 0.79 MG/DL (ref 0.57–1)
DEPRECATED RDW RBC AUTO: 41.7 FL (ref 37–54)
EOSINOPHIL # BLD AUTO: 0.07 10*3/MM3 (ref 0–0.4)
EOSINOPHIL NFR BLD AUTO: 0.5 % (ref 0.3–6.2)
ERYTHROCYTE [DISTWIDTH] IN BLOOD BY AUTOMATED COUNT: 14.4 % (ref 12.3–15.4)
GFR SERPL CREATININE-BSD FRML MDRD: 122 ML/MIN/1.73
GFR SERPL CREATININE-BSD FRML MDRD: 87 ML/MIN/1.73
GLUCOSE BLDC GLUCOMTR-MCNC: 170 MG/DL (ref 70–130)
GLUCOSE BLDC GLUCOMTR-MCNC: 383 MG/DL (ref 70–130)
GLUCOSE BLDC GLUCOMTR-MCNC: 397 MG/DL (ref 70–130)
GLUCOSE BLDC GLUCOMTR-MCNC: 435 MG/DL (ref 70–130)
GLUCOSE BLDC GLUCOMTR-MCNC: 438 MG/DL (ref 70–130)
GLUCOSE SERPL-MCNC: 310 MG/DL (ref 65–99)
GLUCOSE SERPL-MCNC: 362 MG/DL (ref 65–99)
HCT VFR BLD AUTO: 37.3 % (ref 34–46.6)
HGB BLD-MCNC: 12.1 G/DL (ref 12–15.9)
IMM GRANULOCYTES # BLD AUTO: 0.06 10*3/MM3 (ref 0–0.05)
IMM GRANULOCYTES NFR BLD AUTO: 0.4 % (ref 0–0.5)
LYMPHOCYTES # BLD AUTO: 1.32 10*3/MM3 (ref 0.7–3.1)
LYMPHOCYTES NFR BLD AUTO: 9.7 % (ref 19.6–45.3)
MCH RBC QN AUTO: 26.5 PG (ref 26.6–33)
MCHC RBC AUTO-ENTMCNC: 32.4 G/DL (ref 31.5–35.7)
MCV RBC AUTO: 81.6 FL (ref 79–97)
MONOCYTES # BLD AUTO: 0.21 10*3/MM3 (ref 0.1–0.9)
MONOCYTES NFR BLD AUTO: 1.5 % (ref 5–12)
NEUTROPHILS NFR BLD AUTO: 11.86 10*3/MM3 (ref 1.7–7)
NEUTROPHILS NFR BLD AUTO: 87.7 % (ref 42.7–76)
NRBC BLD AUTO-RTO: 0.1 /100 WBC (ref 0–0.2)
PLATELET # BLD AUTO: 284 10*3/MM3 (ref 140–450)
PMV BLD AUTO: 10.7 FL (ref 6–12)
POTASSIUM SERPL-SCNC: 3.7 MMOL/L (ref 3.5–5.2)
POTASSIUM SERPL-SCNC: 4.2 MMOL/L (ref 3.5–5.2)
RBC # BLD AUTO: 4.57 10*6/MM3 (ref 3.77–5.28)
SARS-COV-2 RNA RESP QL NAA+PROBE: NOT DETECTED
SODIUM SERPL-SCNC: 139 MMOL/L (ref 136–145)
SODIUM SERPL-SCNC: 139 MMOL/L (ref 136–145)
WBC # BLD AUTO: 13.55 10*3/MM3 (ref 3.4–10.8)

## 2021-10-14 PROCEDURE — 63710000001 INSULIN REGULAR HUMAN PER 5 UNITS: Performed by: INTERNAL MEDICINE

## 2021-10-14 PROCEDURE — 63710000001 INSULIN LISPRO (HUMAN) PER 5 UNITS: Performed by: INTERNAL MEDICINE

## 2021-10-14 PROCEDURE — 86160 COMPLEMENT ANTIGEN: CPT | Performed by: INTERNAL MEDICINE

## 2021-10-14 PROCEDURE — 63710000001 PREDNISONE PER 1 MG: Performed by: INTERNAL MEDICINE

## 2021-10-14 PROCEDURE — 63710000001 INSULIN GLARGINE PER 5 UNITS: Performed by: INTERNAL MEDICINE

## 2021-10-14 PROCEDURE — 86161 COMPLEMENT/FUNCTION ACTIVITY: CPT | Performed by: INTERNAL MEDICINE

## 2021-10-14 PROCEDURE — 25010000002 DIPHENHYDRAMINE PER 50 MG: Performed by: INTERNAL MEDICINE

## 2021-10-14 PROCEDURE — 82962 GLUCOSE BLOOD TEST: CPT

## 2021-10-14 PROCEDURE — 80048 BASIC METABOLIC PNL TOTAL CA: CPT | Performed by: INTERNAL MEDICINE

## 2021-10-14 RX ORDER — DIPHENHYDRAMINE HYDROCHLORIDE 50 MG/ML
25 INJECTION INTRAMUSCULAR; INTRAVENOUS EVERY 6 HOURS PRN
Status: DISCONTINUED | OUTPATIENT
Start: 2021-10-14 | End: 2021-10-15 | Stop reason: HOSPADM

## 2021-10-14 RX ORDER — PREDNISONE 20 MG/1
40 TABLET ORAL ONCE
Status: COMPLETED | OUTPATIENT
Start: 2021-10-14 | End: 2021-10-14

## 2021-10-14 RX ORDER — PREDNISONE 20 MG/1
40 TABLET ORAL DAILY
Qty: 30 TABLET | Refills: 0 | Status: SHIPPED | OUTPATIENT
Start: 2021-10-15 | End: 2021-10-19

## 2021-10-14 RX ORDER — DEXTROSE MONOHYDRATE 25 G/50ML
25 INJECTION, SOLUTION INTRAVENOUS
Status: DISCONTINUED | OUTPATIENT
Start: 2021-10-14 | End: 2021-10-14 | Stop reason: SDUPTHER

## 2021-10-14 RX ORDER — NICOTINE POLACRILEX 4 MG
15 LOZENGE BUCCAL
Status: DISCONTINUED | OUTPATIENT
Start: 2021-10-14 | End: 2021-10-15 | Stop reason: HOSPADM

## 2021-10-14 RX ORDER — NICOTINE POLACRILEX 4 MG
15 LOZENGE BUCCAL
Status: DISCONTINUED | OUTPATIENT
Start: 2021-10-14 | End: 2021-10-14 | Stop reason: SDUPTHER

## 2021-10-14 RX ORDER — INSULIN GLARGINE 100 [IU]/ML
10 INJECTION, SOLUTION SUBCUTANEOUS EVERY 12 HOURS SCHEDULED
Status: DISCONTINUED | OUTPATIENT
Start: 2021-10-14 | End: 2021-10-14

## 2021-10-14 RX ORDER — INSULIN GLARGINE 100 [IU]/ML
20 INJECTION, SOLUTION SUBCUTANEOUS EVERY 12 HOURS SCHEDULED
Status: DISCONTINUED | OUTPATIENT
Start: 2021-10-14 | End: 2021-10-15 | Stop reason: HOSPADM

## 2021-10-14 RX ORDER — DIPHENHYDRAMINE HCL 25 MG
25 TABLET ORAL EVERY 8 HOURS PRN
Qty: 30 TABLET | Refills: 0 | Status: SHIPPED | OUTPATIENT
Start: 2021-10-14

## 2021-10-14 RX ORDER — INSULIN LISPRO 100 [IU]/ML
0-7 INJECTION, SOLUTION INTRAVENOUS; SUBCUTANEOUS
Status: DISCONTINUED | OUTPATIENT
Start: 2021-10-14 | End: 2021-10-14

## 2021-10-14 RX ORDER — INSULIN GLARGINE 100 [IU]/ML
20 INJECTION, SOLUTION SUBCUTANEOUS EVERY 12 HOURS SCHEDULED
Status: DISCONTINUED | OUTPATIENT
Start: 2021-10-15 | End: 2021-10-14

## 2021-10-14 RX ORDER — FAMOTIDINE 20 MG/1
20 TABLET, FILM COATED ORAL
Status: DISCONTINUED | OUTPATIENT
Start: 2021-10-14 | End: 2021-10-15 | Stop reason: HOSPADM

## 2021-10-14 RX ORDER — DEXTROSE MONOHYDRATE 25 G/50ML
25 INJECTION, SOLUTION INTRAVENOUS
Status: DISCONTINUED | OUTPATIENT
Start: 2021-10-14 | End: 2021-10-15 | Stop reason: HOSPADM

## 2021-10-14 RX ORDER — VERAPAMIL HYDROCHLORIDE 240 MG/1
240 TABLET, FILM COATED, EXTENDED RELEASE ORAL NIGHTLY
Status: DISCONTINUED | OUTPATIENT
Start: 2021-10-14 | End: 2021-10-15 | Stop reason: HOSPADM

## 2021-10-14 RX ADMIN — FAMOTIDINE 20 MG: 20 TABLET, FILM COATED ORAL at 18:04

## 2021-10-14 RX ADMIN — SODIUM CHLORIDE, PRESERVATIVE FREE 10 ML: 5 INJECTION INTRAVENOUS at 05:08

## 2021-10-14 RX ADMIN — INSULIN LISPRO 6 UNITS: 100 INJECTION, SOLUTION INTRAVENOUS; SUBCUTANEOUS at 11:46

## 2021-10-14 RX ADMIN — DIPHENHYDRAMINE HYDROCHLORIDE 25 MG: 50 INJECTION, SOLUTION INTRAMUSCULAR; INTRAVENOUS at 21:13

## 2021-10-14 RX ADMIN — INSULIN GLARGINE 10 UNITS: 100 INJECTION, SOLUTION SUBCUTANEOUS at 18:04

## 2021-10-14 RX ADMIN — INSULIN GLARGINE 20 UNITS: 100 INJECTION, SOLUTION SUBCUTANEOUS at 21:13

## 2021-10-14 RX ADMIN — INSULIN HUMAN 5 UNITS: 100 INJECTION, SOLUTION PARENTERAL at 21:13

## 2021-10-14 RX ADMIN — INSULIN LISPRO 4 UNITS: 100 INJECTION, SOLUTION INTRAVENOUS; SUBCUTANEOUS at 18:15

## 2021-10-14 RX ADMIN — VERAPAMIL HYDROCHLORIDE 240 MG: 240 TABLET, FILM COATED, EXTENDED RELEASE ORAL at 21:12

## 2021-10-14 RX ADMIN — INSULIN HUMAN 3 UNITS: 100 INJECTION, SOLUTION PARENTERAL at 23:38

## 2021-10-14 RX ADMIN — PREDNISONE 40 MG: 20 TABLET ORAL at 14:06

## 2021-10-14 NOTE — CASE MANAGEMENT/SOCIAL WORK
Discharge Planning Assessment  [facility]     Patient Name: Ashli Dimas  MRN: [id number]  Today's Date: 10/14/2021    Admit Date: 10/13/2021     Discharge Needs Assessment     Row Name 10/14/21 0839       Living Environment    Lives With child(tosin), adult; grandchild(tosin)    Name(s) of Who Lives With Patient Daughter  ( Terese Dimas [phone]) and grandchildren ages 21 y/o, 16 y/o and 10 y/o)    Current Living Arrangements home/apartment/condo    Primary Care Provided by self    Provides Primary Care For no one    Family Caregiver if Needed child(tosin), adult; grandchild(tosin), adult    Family Caregiver Names Daughter  ( Terese Dimas [phone])    Quality of Family Relationships helpful; involved; supportive    Able to Return to Prior Arrangements yes    Living Arrangement Comments Pt lives in a two story house with her daughter  ( Terese Dimas [phone]) and grandchildren ages 21 y/o, 16 y/o and 10 y/o).       Resource/Environmental Concerns    Resource/Environmental Concerns none    Transportation Concerns car, none       Transition Planning    Patient/Family Anticipates Transition to home with family    Patient/Family Anticipated Services at Transition none    Transportation Anticipated family or friend will provide       Discharge Needs Assessment    Equipment Currently Used at Home grab bar    Concerns to be Addressed no discharge needs identified; denies needs/concerns at this time    Anticipated Changes Related to Illness none    Equipment Needed After Discharge grab bar, tub/shower               Discharge Plan     Row Name 10/14/21 0841       Plan    Plan Plan home with family.   LEEANN Cole RN    Patient/Family in Agreement with Plan yes    Plan Comments FACE SHEET VERIFIED/ IM LETTER SIGNED.  Spoke with pt at bedside.  Pt's PCP is Dr. Jenna Chu.  Pt lives in a two story house with her daughter  ( Terese Dimas [phone]) and grandchildren ages 21 y/o, 16 y/o and 10 y/o).  Pt is independent  with ADLs.  Pt has a grab bar in her bathroom.   Pt gets her prescriptions at [facility]  ([facility] [address]  Pt denies any issues affording medications.  Pt is not current with HH.  Pt has not been in SNF.  Pt states her family will assist her at home if needed.  Pt's family will transport pt home.  Plan home with family.   LEEANN Cole RN              Continued Care and Services - Admitted Since 10/13/2021    Coordination has not been started for this encounter.          Demographic Summary     Row Name 10/14/21 0838       General Information    Admission Type inpatient    Arrived From emergency department    Required Notices Provided Important Message from Medicare    Referral Source admission list    Reason for Consult discharge planning    Preferred Language English     Used During This Interaction no               Functional Status     Row Name 10/14/21 0839       Functional Status    Usual Activity Tolerance good    Current Activity Tolerance good       Functional Status, IADL    Medications independent    Meal Preparation independent    Housekeeping independent    Laundry independent    Shopping independent       Mental Status    General Appearance WDL WDL               Psychosocial    No documentation.                Abuse/Neglect    No documentation.                Legal    No documentation.                Substance Abuse    No documentation.                Patient Forms    No documentation.                   Tenisha Cole, RN

## 2021-10-14 NOTE — H&P
Group: [facility]         Critical care admission note    Patient Identification:  Ashli Dimas  71 y.o.  female  1950  [id number]                CC: Tongue swelling, difficulty swallowing    History of Present Illness:  71-year-old -American female who presents with swelling of her lip, tongue and the base of her mouth.  She says she feels the tissue as being quite tight around her lower jaw.  She has some difficulty swallowing her own saliva.  She denies any difficulty breathing.  She did have some lightheadedness.  All of this started around 5 PM earlier this evening.  She decided to go to the urgent care when she noticed that she could not manage her saliva.  There she received IV Solu-Medrol followed by epinephrine.  This did not help the patient so they called EMS and transferred her to this hospital's emergency room.  I discussed the case with Dr. Blane Lofton.  On route EMS administered 50 of Benadryl IV.  The patient says her swelling is improving now.  She has never had a similar reaction before.  She used to take ACE inhibitors but these were discontinued many years ago due to chronic cough.  She has never had any anaphylaxis or angioedema type reaction.  Denies fever, chest pain, diarrhea or abdominal pain.  I reviewed old records.  She was in the hospital October 17, 2019 for vestibular neuropathy with vertigo.  She has diabetes type 2 and hypertension.    Review of Systems   Constitutional: Negative for diaphoresis, fatigue and fever.   HENT: Positive for drooling, facial swelling and trouble swallowing. Negative for ear discharge and sore throat.         Swelling of her tongue   Eyes: Negative for pain and visual disturbance.   Respiratory: Negative for cough and shortness of breath.    Cardiovascular: Negative for chest pain and leg swelling.   Gastrointestinal: Negative for abdominal pain and diarrhea.   Endocrine: Negative for cold intolerance and polyuria.  "  Genitourinary: Negative for dysuria and hematuria.   Musculoskeletal: Negative for joint swelling and myalgias.   Skin: Negative for rash and wound.   Neurological: Positive for dizziness and light-headedness. Negative for speech difficulty and numbness.   Hematological: Negative for adenopathy. Does not bruise/bleed easily.   Psychiatric/Behavioral: Negative for agitation and confusion.       Past Medical History:  Past Medical History:   Diagnosis Date   • CVA (cerebral vascular accident) (CMS/[facility])    • Diabetes mellitus (CMS/[facility])    • Hypertension        Past Surgical History:  No past surgical history on file.     Home Meds:  Reviewed and reconciled    Allergies:  No Known Allergies    Social History:   Social History     Socioeconomic History   • Marital status: Single   Tobacco Use   • Smoking status: Former Smoker     Types: Cigarettes     Quit date:      Years since quittin.7   Substance and Sexual Activity   • Alcohol use: No   • Drug use: No   • Sexual activity: Defer       Family History:  No family history on file.    Physical Exam:  /86   Pulse 100   Temp 97.7 °F (36.5 °C) (Temporal)   Resp 16   Ht 162.6 cm (64\")   SpO2 99%   BMI 32.44 kg/m²  Body mass index is 32.44 kg/m². 99%    Physical Exam  HENT:      Right Ear: External ear normal.      Left Ear: External ear normal.      Nose: Nose normal.      Mouth/Throat:      Comments: She has a swollen tongue.  She has swelling of the soft tissue of the floor of her mouth underneath her tongue.  Her hypopharynx is barely visible, Mallampati 3  Eyes:      Conjunctiva/sclera: Conjunctivae normal.      Pupils: Pupils are equal, round, and reactive to light.   Neck:      Thyroid: No thyromegaly.      Vascular: No JVD.      Trachea: No tracheal deviation.      Comments: Neck circumference is greater than 15 inches  Cardiovascular:      Rate and Rhythm: Normal rate and regular rhythm.      Heart sounds: Normal heart sounds. No murmur " heard.      Pulmonary:      Effort: Pulmonary effort is normal.      Breath sounds: Normal breath sounds.   Abdominal:      Palpations: Abdomen is soft.      Tenderness: There is no abdominal tenderness. There is no rebound.      Comments: Cannot palpate liver or spleen enlargement   Musculoskeletal:         General: No deformity. Normal range of motion.      Cervical back: Neck supple. No rigidity.   Skin:     General: Skin is warm.      Findings: No rash.      Comments: No palpable nodules   Neurological:      General: No focal deficit present.      Mental Status: She is alert and oriented to person, place, and time.      Cranial Nerves: No cranial nerve deficit.      Motor: No weakness.   Psychiatric:         Mood and Affect: Mood normal.         Thought Content: Thought content normal.         LABS:  No results found for: COVID19    Lab Results   Component Value Date    CALCIUM 8.6 10/17/2019       Lab Results   Component Value Date    TROPONINT <0.010 10/16/2019         Lab Results   Component Value Date    TSH 0.876 10/16/2019     CrCl cannot be calculated (Patient's most recent lab result is older than the maximum 30 days allowed.).         Imaging: I personally visualized the images of scans/x-rays performed within last 3 days.      Assessment:  Angioedema, tongue swelling  Hypertension  Diabetes mellitus      Recommendations:  Admit to intensive care unit for observation.  Currently she is controlling her airway well and able to maintain without difficulty but she had drooling and salivary incontinence.  She also still has tongue swelling and swelling of the soft tissue of the base of the mouth.  She has received epinephrine, Solu-Medrol and Pepcid.  We will monitor her vital signs and her airway while she is in the intensive care unit.  She may be released home tomorrow in the afternoon.  I would recommend not restarting atorvastatin nor metformin, since these 2 medications seem to be the only generics on  her list of medications.  Unfortunately there is very poor  of the imported generics to the [place].  They are manufactured overseas and the majority of them contained impurities and there have been a lot of reports in the news lately of these drugs being recalled due to contamination with other chemical impurities.  I would not rechallenge her with her current home prescriptions.  She may develop this angioedema again at home which could be life-threatening.        Amos Warren MD  10/13/2021  23:55 EDT      Much of this encounter note is an electronic transcription/translation of spoken language to printed text using Dragon Software.

## 2021-10-14 NOTE — PLAN OF CARE
Goal Outcome Evaluation: pt swelling has got better. No acute changes in ICU. Pt has transfer order placed, report given to [address] [place] [phone]

## 2021-10-14 NOTE — DISCHARGE SUMMARY
DISCHARGE SUMMARY    Patient Name: Ashli Dimas  Age/Sex: 71 y.o. female  : 1950  MRN: [id number]  Patient Care Team:  Jenna Chu MD as PCP - General (Internal Medicine)       Date of Admit: 10/13/2021  Date of Discharge:  10/14/21  Discharge Condition: Good    Discharge Diagnoses:  1. Angioedema, tongue swelling, almost resolved on discharge  2. Essential hypertension  3. Diabetes mellitus type II, on Metformin  4. Steroid-induced leukocytosis  5. Obesity (BMI 33)  6. Snoring, probable sleep apnea    History of present illness from H&P from 10/13/2021: Per Dr. Warren  71-year-old -American female who presents with swelling of her lip, tongue and the base of her mouth.  She says she feels the tissue as being quite tight around her lower jaw.  She has some difficulty swallowing her own saliva.  She denies any difficulty breathing.  She did have some lightheadedness.  All of this started around 5 PM earlier this evening.  She decided to go to the urgent care when she noticed that she could not manage her saliva.  There she received IV Solu-Medrol followed by epinephrine.  This did not help the patient so they called EMS and transferred her to this hospital's emergency room.  I discussed the case with Dr. Blane Lofton.  On route EMS administered 50 of Benadryl IV.  The patient says her swelling is improving now.  She has never had a similar reaction before.  She used to take ACE inhibitors but these were discontinued many years ago due to chronic cough.  She has never had any anaphylaxis or angioedema type reaction.  Denies fever, chest pain, diarrhea or abdominal pain.    Hospital Course:   Patient was admitted to the hospital and treated with Pepcid and Benadryl.  She improved significantly.  She did not have any stridor and the swelling in her tongue improved clinically and on exam.  She denied prior history of similar event.  There was no apparent trigger.  She had no family history of  angioedema.    At first, it was thought that some of her home medications could be the trigger but upon further questioning, she indicated that she has been taking the medications for several month and had no issues and her last refill was about 2 weeks ago and she has been using the same bottle.  She had no new medications.  She does not take cholesterol medications per her reports and the only medication that she takes are Metformin and verapamil in addition to vitamins.  Her verapamil was continued during the admission but Metformin was held initially due to concerns about having impurities and causing angioedema but as stated above and due to lack of definitive causality, she was instructed to resume the Metformin at home.  She was instructed that if she does experience any recurrent symptoms then she should stop her home medications and take steroid (was prescribed extra pills) and Benadryl and contact her primary physician.  C1 esterase was collected on the day of discharge and should be followed by her primary care physician.    Otherwise I will prescribe her 4 days course of prednisone 40 mg (she received 1 dose today on discharge) and PRN Benadryl.    KINGS screening:  She was screened for sleep apnea due to her obesity in addition to diabetes and essential hypertension.  She did endorse loud snoring which disturbs her family and can be heard from a different room.  She does awaken herself snoring sometimes.  She reported occasional choking.  No reports of apnea.  She feels tired throughout the day and also wake up multiple times at night to urinate and wakes up with a dry mouth and often with headache in the morning.  -Bedtime: 7:30 PM.  -Sleep latency: Several hours.  -Wake up time 5 AM.  Does not feel refreshed.    She did endorse dosing of during the day sometimes when things are quiet and taking a nap.    I discussed with her the probability of sleep apnea and we talked about testing and treatment with  CPAP.  She is agreeable to get evaluated.  We will get a home sleep apnea testing and proceed with therapy if she test positive.  We also discussed the situation in between KINGS and metabolic syndrome including diabetes, HTN and dyslipidemia.      Consults:   IP CONSULT TO PULMONOLOGY    Significant Discharge Diagnostics   Procedures Performed:         Pertinent Lab Results:  Results from last 7 days   Lab Units 10/14/21  0747 10/13/21  2348   SODIUM mmol/L 139 139   POTASSIUM mmol/L 4.2 3.7   CHLORIDE mmol/L 105 99   CO2 mmol/L 18.8* 24.5   BUN mg/dL 11 11   CREATININE mg/dL 0.59 0.79   GLUCOSE mg/dL 310* 362*   CALCIUM mg/dL 8.4* 9.4         Results from last 7 days   Lab Units 10/13/21  2348   WBC 10*3/mm3 13.55*   HEMOGLOBIN g/dL 12.1   HEMATOCRIT % 37.3   PLATELETS 10*3/mm3 284   MCV fL 81.6   MCH pg 26.5*   MCHC g/dL 32.4   RDW % 14.4   RDW-SD fl 41.7   MPV fL 10.7   NEUTROPHIL % % 87.7*   LYMPHOCYTE % % 9.7*   MONOCYTES % % 1.5*   EOSINOPHIL % % 0.5   BASOPHIL % % 0.2   IMM GRAN % % 0.4   NEUTROS ABS 10*3/mm3 11.86*   LYMPHS ABS 10*3/mm3 1.32   MONOS ABS 10*3/mm3 0.21   EOS ABS 10*3/mm3 0.07   BASOS ABS 10*3/mm3 0.03   IMMATURE GRANS (ABS) 10*3/mm3 0.06*   NRBC /100 WBC 0.1           Imaging Results:  Imaging Results (All)     None          Objective:   Temp:  [97 °F (36.1 °C)-97.7 °F (36.5 °C)] 97.2 °F (36.2 °C)  Heart Rate:  [] 90  Resp:  [15-17] 16  BP: (156-179)/(78-97) 179/97   SpO2:  [91 %-99 %] 98 %  on    Device (Oxygen Therapy): room air    Intake/Output Summary (Last 24 hours) at 10/14/2021 1207  Last data filed at 10/14/2021 0300  Gross per 24 hour   Intake 120 ml   Output --   Net 120 ml     Body mass index is 33.23 kg/m².      10/13/21  2355   Weight: 87.8 kg (193 lb 9 oz)     Weight change:     Physical Exam:      General: Alert, cooperative, in no acute distress.         HEENT:  Mallampati 4.  Moist tongue.  External ears normal.   Chest Wall:  No abnormalities observed.             Neck:   Trachea midline. No JVD.   Pulmonary:  CTAB. No wheezes. Respirations regular, even and unlabored.          Cardio:  Regular rhythm and normal rate. Normal S1 and S2. No murmur, gallop, rub or click.    Abdominal:  Soft, non-tender and non-distended. Normal bowel sounds. No masses. No organomegaly. No guarding. No rebound tenderness.  Extremities:  Moves all extremities well. No cyanosis. No redness. No edema.     Discharge Medications and Instructions:     Discharge Medications     Discharge Medications      New Medications      Instructions Start Date   diphenhydrAMINE 25 MG tablet  Commonly known as: Benadryl Allergy   25 mg, Oral, Every 8 Hours PRN      predniSONE 20 MG tablet  Commonly known as: DELTASONE   40 mg, Oral, Daily   Start Date: October 15, 2021        Continue These Medications      Instructions Start Date   aspirin 81 MG chewable tablet   81 mg, Oral, Daily      atorvastatin 10 MG tablet  Commonly known as: LIPITOR   10 mg, Oral, Nightly      meclizine 12.5 MG tablet  Commonly known as: ANTIVERT   12.5 mg, Oral, 3 Times Daily PRN      metFORMIN 1000 MG tablet  Commonly known as: GLUCOPHAGE   1,000 mg, Oral, 2 Times Daily With Meals      verapamil  MG CR tablet  Commonly known as: CALAN-SR   240 mg, Oral, Nightly             Discharge Diet:    Dietary Orders (From admission, onward)     Start     Ordered    10/14/21 1203  Diet Regular; Consistent Carbohydrate, Cardiac  Diet Effective Now        Question Answer Comment   Diet Texture / Consistency Regular    Common Modifiers Consistent Carbohydrate    Common Modifiers Cardiac        10/14/21 1202                Activity at Discharge:   As tolerated  Discharge disposition: Home    Discharge Instructions and Follow ups:     Follow-up Information     Jenna Chu MD. Schedule an appointment as soon as possible for a visit in 1 week(s).    Specialty: Internal Medicine  Contact information:  [address]  [address]  [facility]  [address]  [phone]                       No future appointments.     Medication Reconciliation: Please see electronically completed Med Rec.    Total time spent discharging patient including evaluation, medication reconciliation, arranging follow up, and post hospitalization instructions and education total time exceeds 30 minutes.     Jeffy Fowler MD  10/14/21  12:07 EDT      Dictated utilizing Dragon dictation

## 2021-10-14 NOTE — CASE MANAGEMENT/SOCIAL WORK
Continued Stay Note  [facility]     Patient Name: Ashli Dimas  MRN: [id number]  Today's Date: 10/14/2021    Admit Date: 10/13/2021     Discharge Plan     Row Name 10/14/21 0841       Plan    Plan Plan home with family.   LEEANN Cole RN    Patient/Family in Agreement with Plan yes    Plan Comments FACE SHEET VERIFIED/ IM LETTER SIGNED.  Spoke with pt at bedside.  Pt's PCP is Dr. Jenna Chu.  Pt lives in a two story house with her daughter  ( Terese Dimas [phone]) and grandchildren ages 23 y/o, 18 y/o and 10 y/o).  Pt is independent with ADLs.  Pt has a grab bar in her bathroom.   Pt gets her prescriptions at [facility]  ([facility] [address]  Pt denies any issues affording medications.  Pt is not current with HH.  Pt has not been in SNF.  Pt states her family will assist her at home if needed.  Pt's family will transport pt home.  Plan home with family.   LEEANN Cole RN               Discharge Codes    No documentation.                     Tenisha Cole, RN

## 2021-10-14 NOTE — ED PROVIDER NOTES
EMERGENCY DEPARTMENT ENCOUNTER    Room Number:  21/21  Date of encounter:  10/14/2021  PCP: Jenna Chu MD  Historian: Patient/EMS    Patient was placed in face mask during triage process. Patient was wearing facemask when I entered the room and throughout our encounter. I wore full protective equipment throughout this patient encounter including a face mask, eye protection, and gloves. Hand hygiene was performed before donning protective equipment and again following doffing of PPE after leaving the room.    HPI:  Chief Complaint: Tongue and lip swelling  A complete HPI/ROS/PMH/PSH/SH/FH are unobtainable due to: N/A   Context: Ashli Dimas is a 71 y.o. female who presents to the ED c/o rather abrupt onset tongue and lip swelling shortly after 1700 this evening.  No itching or dyspnea the patient reports that her tongue was so swollen that she could not manage her secretions.  She went to an urgent care which provided her with Solu-Medrol and observed her.  When it was not improving they also gave her subcu epi 0.15 mg.  When this did not help they called EMS.  EMS gave the patient 50 of IV Benadryl in route.  Patient reports that her swelling is much improved at this time.  No history of similar.  Remote history of ACE inhibitor that was stopped secondary to chronic cough.  No new medications or exposures identified.  Denies ongoing shortness of breath, chest pain, abdominal pain, nausea or vomiting.    MEDICAL HISTORY REVIEW  History hypertension, hyperlipidemia, type 2 diabetes.    PAST MEDICAL HISTORY  Active Ambulatory Problems     Diagnosis Date Noted   • Strep pharyngitis 06/17/2018   • Sepsis due to group A Streptococcus (HCC) 06/17/2018   • Type 2 diabetes mellitus with hyperglycemia (HCC) 06/17/2018   • Essential hypertension 06/17/2018   • Dehydration 06/17/2018   • Hyponatremia 06/17/2018   • Balance problem 10/16/2019   • Vestibular neuropathy 10/16/2019     Resolved Ambulatory Problems      Diagnosis Date Noted   • No Resolved Ambulatory Problems     Past Medical History:   Diagnosis Date   • CVA (cerebral vascular accident) (CMS/HCC)    • Diabetes mellitus (CMS/HCC)    • Hypertension          PAST SURGICAL HISTORY  No past surgical history on file.      FAMILY HISTORY  No family history on file.      SOCIAL HISTORY  Social History     Socioeconomic History   • Marital status: Single   Tobacco Use   • Smoking status: Former Smoker     Types: Cigarettes     Quit date:      Years since quittin.8   Substance and Sexual Activity   • Alcohol use: No   • Drug use: No   • Sexual activity: Defer         ALLERGIES  Patient has no known allergies.        REVIEW OF SYSTEMS  Review of Systems     All systems reviewed and negative except for those discussed in HPI.       PHYSICAL EXAM    I have reviewed the triage vital signs and nursing notes.    ED Triage Vitals [10/13/21 2310]   Temp Heart Rate Resp BP SpO2   97.7 °F (36.5 °C) 100 16 178/86 99 %      Temp src Heart Rate Source Patient Position BP Location FiO2 (%)   Temporal -- -- -- --       Physical Exam    Physical Exam   Constitutional: No distress.   HENT:  Head: Normocephalic and atraumatic.   Oropharynx: Mucous membranes are moist.  Large edematous tongue and sublingual space.  Managing secretions reasonably.  Mild perioral swelling.  Eyes: No scleral icterus. No conjunctival pallor.  Neck: Painless range of motion noted. Neck supple.  No stridor.  No findings of Alessandro angina.  Cardiovascular: Normal rate, regular rhythm and intact distal pulses.  Pulmonary/Chest: No respiratory distress. There are no wheezes, no rhonchi, and no rales.   Abdominal: Soft. There is no tenderness. There is no rebound and no guarding.   Musculoskeletal: Moves all extremities equally. There is no pedal edema or calf tenderness.   Neurological: Alert.  Baseline strength and sensation noted.   Skin: Skin is pink, warm, and dry. No pallor.   Psychiatric: Mood and affect  normal.   Nursing note and vitals reviewed.    LAB RESULTS  Recent Results (from the past 24 hour(s))   Basic Metabolic Panel    Collection Time: 10/13/21 11:48 PM    Specimen: Blood   Result Value Ref Range    Glucose 362 (H) 65 - 99 mg/dL    BUN 11 8 - 23 mg/dL    Creatinine 0.79 0.57 - 1.00 mg/dL    Sodium 139 136 - 145 mmol/L    Potassium 3.7 3.5 - 5.2 mmol/L    Chloride 99 98 - 107 mmol/L    CO2 24.5 22.0 - 29.0 mmol/L    Calcium 9.4 8.6 - 10.5 mg/dL    eGFR  African Amer 87 >60 mL/min/1.73    BUN/Creatinine Ratio 13.9 7.0 - 25.0    Anion Gap 15.5 (H) 5.0 - 15.0 mmol/L   CBC Auto Differential    Collection Time: 10/13/21 11:48 PM    Specimen: Blood   Result Value Ref Range    WBC 13.55 (H) 3.40 - 10.80 10*3/mm3    RBC 4.57 3.77 - 5.28 10*6/mm3    Hemoglobin 12.1 12.0 - 15.9 g/dL    Hematocrit 37.3 34.0 - 46.6 %    MCV 81.6 79.0 - 97.0 fL    MCH 26.5 (L) 26.6 - 33.0 pg    MCHC 32.4 31.5 - 35.7 g/dL    RDW 14.4 12.3 - 15.4 %    RDW-SD 41.7 37.0 - 54.0 fl    MPV 10.7 6.0 - 12.0 fL    Platelets 284 140 - 450 10*3/mm3    Neutrophil % 87.7 (H) 42.7 - 76.0 %    Lymphocyte % 9.7 (L) 19.6 - 45.3 %    Monocyte % 1.5 (L) 5.0 - 12.0 %    Eosinophil % 0.5 0.3 - 6.2 %    Basophil % 0.2 0.0 - 1.5 %    Immature Grans % 0.4 0.0 - 0.5 %    Neutrophils, Absolute 11.86 (H) 1.70 - 7.00 10*3/mm3    Lymphocytes, Absolute 1.32 0.70 - 3.10 10*3/mm3    Monocytes, Absolute 0.21 0.10 - 0.90 10*3/mm3    Eosinophils, Absolute 0.07 0.00 - 0.40 10*3/mm3    Basophils, Absolute 0.03 0.00 - 0.20 10*3/mm3    Immature Grans, Absolute 0.06 (H) 0.00 - 0.05 10*3/mm3    nRBC 0.1 0.0 - 0.2 /100 WBC       Ordered the above labs and independently reviewed the results.        RADIOLOGY  No Radiology Exams Resulted Within Past 24 Hours    I ordered the above noted radiological studies. Reviewed by me and discussed with radiologist.  See dictation for official radiology interpretation.      PROCEDURES    Procedures        MEDICATIONS GIVEN IN  ER    Medications   sodium chloride 0.9 % flush 10 mL (has no administration in time range)   sodium chloride 0.9 % flush 10 mL (has no administration in time range)   sodium chloride 0.9 % flush 10 mL (has no administration in time range)   ondansetron (ZOFRAN) tablet 4 mg (has no administration in time range)     Or   ondansetron (ZOFRAN) injection 4 mg (has no administration in time range)   acetaminophen (TYLENOL) tablet 650 mg (has no administration in time range)     Or   acetaminophen (TYLENOL) suppository 650 mg (has no administration in time range)   famotidine (PEPCID) injection 20 mg (20 mg Intravenous Given 10/13/21 2338)         PROGRESS, DATA ANALYSIS, CONSULTS, AND MEDICAL DECISION MAKING      All labs have been independently reviewed by me.  All radiology studies have been reviewed by me and discussed with radiologist dictating the report.   EKG's independently viewed and interpreted by me.  Discussion below represents my analysis of pertinent findings related to patient's condition, differential diagnosis, treatment plan and final disposition.      ED Course as of 10/14/21 0024   Wed Oct 13, 2021   2324 We will add famotidine and monitor the patient's airway closely. [RS]   2343 CONSULT        Provider: Dr. Warren-pulmonary/critical care    Discussion: Reviewed patient history, ED presentation and evaluation including therapies that have been initiated.  He is agreeable to see the patient ED in consultation.    Agreeable c treatment and planned disposition.         [RS]   Thu Oct 14, 2021   0023 Dr. Warren agreeable with plan for admission to the ICU for close observation and treatment as needed.  Patient agreeable with this plan. [RS]      ED Course User Index  [RS] Blane Lofton MD       AS OF 00:24 EDT VITALS:    BP - 178/86  HR - 100  TEMP - 97.7 °F (36.5 °C) (Temporal)  O2 SATS - 99%        DIAGNOSIS  Final diagnoses:   Angio-edema, initial encounter   Elevated blood pressure reading in office  with diagnosis of hypertension   Hyperglycemia         DISPOSITION  ADMISSION    Discussed treatment plan and reason for admission with pt/family and admitting physician.  Pt/family voiced understanding of the plan for admission for further testing/treatment as needed.          Blane Lofton MD  10/14/21 0024

## 2021-10-14 NOTE — PROGRESS NOTES
"                                              LOS: 1 day   Patient Care Team:  Jenan Chu MD as PCP - General (Internal Medicine)    Chief Complaint:  F/up angioedema, DM type II and HTN    Subjective   Interval History  I reviewed the admission note, progress notes, PMH, PSH, Family hx, social history, imagings and prior records on this admission, summarized the finding in my note and formulated a transition of care plan.    She denies wheezing or trouble breathing.  She had swelling on presentation but significantly improved this morning however by the time we were going to discharge her home, the swelling returned back under her tongue.    REVIEW OF SYSTEMS:   CARDIOVASCULAR: No chest pain, chest pressure or chest discomfort. No palpitations or edema.   RESPIRATORY: She had mild cough with no shortness of breath.  Cough resolved.  GASTROINTESTINAL: No anorexia, nausea, vomiting or diarrhea. No abdominal pain.  CONSTITUTIONAL: No fever or chills.     Ventilator/Non-Invasive Ventilation Settings (From admission, onward)            None                Physical Exam:     Vital Signs  Temp:  [97 °F (36.1 °C)-97.7 °F (36.5 °C)] 97.2 °F (36.2 °C)  Heart Rate:  [] 90  Resp:  [15-17] 16  BP: (156-179)/(78-97) 179/97    Intake/Output Summary (Last 24 hours) at 10/14/2021 1456  Last data filed at 10/14/2021 0300  Gross per 24 hour   Intake 120 ml   Output --   Net 120 ml     Flowsheet Rows      First Filed Value   Admission Height 162.6 cm (64\") Documented at 10/13/2021 2310   Admission Weight 87.8 kg (193 lb 9 oz) Documented at 10/13/2021 2355          PPE used per hospital policy    General Appearance:   Alert, cooperative, in no acute distress   ENMT:  Mallampati score 4, moist mucous membrane.  Swelling under the tongue and in the anterior surface of the cheeks   Eyes:  Pupils equal and reactive to light. EOMI   Neck:   Large. Trachea midline. No thyromegaly.  No stridor   Lungs:    Clear to " auscultation,respirations regular, even and nonlabored    Heart:   Regular rhythm and normal rate, normal S1 and S2, no         murmur   Skin:   No rash or ecchymosis   Abdomen:    Obese. Soft. No tenderness. No HSM.   Neuro/psych:  Conscious, alert, oriented x3. Strength 5/5 in upper and lower  ext.  Appropriate mood and affect   Extremities:  No cyanosis, clubbing or edema.  Warm extremities and well-perfused          Results Review:        Results from last 7 days   Lab Units 10/14/21  0747 10/13/21  2348 10/13/21  2348   SODIUM mmol/L 139  --  139   POTASSIUM mmol/L 4.2  --  3.7   CHLORIDE mmol/L 105  --  99   CO2 mmol/L 18.8*  --  24.5   BUN mg/dL 11  --  11   CREATININE mg/dL 0.59  --  0.79   GLUCOSE mg/dL 310*   < > 362*   CALCIUM mg/dL 8.4*  --  9.4    < > = values in this interval not displayed.         Results from last 7 days   Lab Units 10/13/21  2348   WBC 10*3/mm3 13.55*   HEMOGLOBIN g/dL 12.1   HEMATOCRIT % 37.3   PLATELETS 10*3/mm3 284         I reviewed the patient's new clinical results.        Medication Review:   insulin glargine, 10 Units, Subcutaneous, Q12H  insulin lispro, 0-7 Units, Subcutaneous, TID AC  insulin regular, 0-7 Units, Subcutaneous, Q6H  sodium chloride, 10 mL, Intravenous, Q12H  verapamil SR, 240 mg, Oral, Nightly        Assessment     1. Angioedema, tongue swelling, almost resolved on discharge  2. Essential hypertension  3. Diabetes mellitus type II, on Metformin, with worsening hyperglycemia due to steroid use  4. Steroid-induced leukocytosis  5. Obesity (BMI 33)  6. Snoring, probable sleep apnea      All problems new to me    Plan     · Prednisone 40 mg daily  · Start Lantus 10 units twice daily and insulin sliding scale  · Continue verapamil for HTN  · Check C1 esterase inhibitor  · Start scheduled Pepcid 20 units twice daily and PRN Bernadryl  · Outpatient HST      Plan was to discharge her home today but early in the afternoon, she developed swelling under her tongue and  the anterior side of her cheeks and therefore the discharge was canceled.    Jeffy Fowler MD  10/14/21  14:56 EDT            This note was dictated utilizing Dragon dictation

## 2021-10-14 NOTE — ED TRIAGE NOTES
Patient presents to ED today with oral swelling. Patient first went to [facility] immediate care where she was reported to have severe tongue lip and throat swelling, she was given 125 mg Solumedrol and .15mg of EPI IM prior to EMS arrival, patient was given 50mg benadryl IV by EMS and is feeling better. Patient still has tongue swelling upon arrival. No shortness of breath or wheezing at this time.     Patient placed in mask by EMS staff, triage staff wearing appropriate PPE.

## 2021-10-14 NOTE — CASE MANAGEMENT/SOCIAL WORK
Case Management Discharge Note      Final Note: Pt discharged home with family.   LEEANN Cole RN         Selected Continued Care - Admitted Since 10/13/2021     Destination    No services have been selected for the patient.              Durable Medical Equipment    No services have been selected for the patient.              Dialysis/Infusion    No services have been selected for the patient.              Home Medical Care    No services have been selected for the patient.              Therapy    No services have been selected for the patient.              Community Resources    No services have been selected for the patient.              Community & DME    No services have been selected for the patient.                  Transportation Services  Private: Car    Final Discharge Disposition Code: 01 - home or self-care

## 2021-10-15 VITALS
HEART RATE: 72 BPM | WEIGHT: 191.1 LBS | RESPIRATION RATE: 16 BRPM | SYSTOLIC BLOOD PRESSURE: 158 MMHG | HEIGHT: 64 IN | TEMPERATURE: 98.1 F | BODY MASS INDEX: 32.62 KG/M2 | DIASTOLIC BLOOD PRESSURE: 89 MMHG | OXYGEN SATURATION: 100 %

## 2021-10-15 LAB
GLUCOSE BLDC GLUCOMTR-MCNC: 208 MG/DL (ref 70–130)
GLUCOSE BLDC GLUCOMTR-MCNC: 208 MG/DL (ref 70–130)
GLUCOSE BLDC GLUCOMTR-MCNC: 210 MG/DL (ref 70–130)
GLUCOSE BLDC GLUCOMTR-MCNC: 217 MG/DL (ref 70–130)
GLUCOSE BLDC GLUCOMTR-MCNC: 259 MG/DL (ref 70–130)

## 2021-10-15 PROCEDURE — 63710000001 INSULIN REGULAR HUMAN PER 5 UNITS: Performed by: INTERNAL MEDICINE

## 2021-10-15 PROCEDURE — 82962 GLUCOSE BLOOD TEST: CPT

## 2021-10-15 PROCEDURE — 63710000001 INSULIN GLARGINE PER 5 UNITS: Performed by: INTERNAL MEDICINE

## 2021-10-15 RX ADMIN — INSULIN HUMAN 5 UNITS: 100 INJECTION, SOLUTION PARENTERAL at 06:37

## 2021-10-15 RX ADMIN — INSULIN GLARGINE 20 UNITS: 100 INJECTION, SOLUTION SUBCUTANEOUS at 08:45

## 2021-10-15 RX ADMIN — INSULIN HUMAN 5 UNITS: 100 INJECTION, SOLUTION PARENTERAL at 11:50

## 2021-10-15 RX ADMIN — SODIUM CHLORIDE, PRESERVATIVE FREE 10 ML: 5 INJECTION INTRAVENOUS at 08:44

## 2021-10-15 RX ADMIN — FAMOTIDINE 20 MG: 20 TABLET, FILM COATED ORAL at 06:37

## 2021-10-15 RX ADMIN — INSULIN HUMAN 5 UNITS: 100 INJECTION, SOLUTION PARENTERAL at 17:08

## 2021-10-15 RX ADMIN — FAMOTIDINE 20 MG: 20 TABLET, FILM COATED ORAL at 17:08

## 2021-10-15 NOTE — PLAN OF CARE
Goal Outcome Evaluation:  Plan of Care Reviewed With: patient        Progress: improving  Outcome Summary: Patient is alert and oriented. VSS- BP elevated at beginning of shift, but after home dose of verapamil back WNL. Blood sugar still elevated at bedtime. 5 units IV insulin and 20 units lantus given as ordered. No swelling noted this shift. Slpet comfortably. Probable d/c today. Will continue to monitor.

## 2021-10-15 NOTE — DISCHARGE SUMMARY
DISCHARGE SUMMARY    Patient Name: Ashli Dimas  Age/Sex: 71 y.o. female  : 1950  MRN: [id number]  Patient Care Team:  Jenna Chu MD as PCP - General (Internal Medicine)       Date of Admit: 10/13/2021  Date of Discharge:  10/15/21  Discharge Condition: Good    Discharge Diagnoses:  1. Angioedema, tongue swelling, almost resolved on discharge  2. Essential hypertension  3. Diabetes mellitus type II, on Metformin, worsening hyperglycemia due to steroid use  4. Steroid-induced leukocytosis  5. Obesity (BMI 33)  6. Snoring, probable sleep apnea    History of present illness from H&P from 10/13/2021: Per Dr. Warren  71-year-old -American female who presents with swelling of her lip, tongue and the base of her mouth.  She says she feels the tissue as being quite tight around her lower jaw.  She has some difficulty swallowing her own saliva.  She denies any difficulty breathing.  She did have some lightheadedness.  All of this started around 5 PM earlier this evening.  She decided to go to the urgent care when she noticed that she could not manage her saliva.  There she received IV Solu-Medrol followed by epinephrine.  This did not help the patient so they called EMS and transferred her to this hospital's emergency room.  I discussed the case with Dr. Blane Lofton.  On route EMS administered 50 of Benadryl IV.  The patient says her swelling is improving now.  She has never had a similar reaction before.  She used to take ACE inhibitors but these were discontinued many years ago due to chronic cough.  She has never had any anaphylaxis or angioedema type reaction.  Denies fever, chest pain, diarrhea or abdominal pain.    Hospital Course:   Patient was admitted to the hospital and treated with Pepcid and Benadryl.  She improved significantly.  She did not have any stridor and the swelling in her tongue improved clinically and on exam.  She denied prior history of similar event.  There was no apparent  trigger.  She had no family history of angioedema.    At first, it was thought that some of her home medications could be the trigger but upon further questioning, she indicated that she has been taking the medications for several month and had no issues and her last refill was about 2 weeks ago and she has been using the same bottle.  She had no new medications.  She does not take cholesterol medications per her reports and the only medication that she takes are Metformin and verapamil in addition to vitamins.  Her verapamil was continued during the admission but Metformin was held initially due to concerns about having impurities and causing angioedema but as stated above and due to lack of definitive causality, she was instructed to resume the Metformin at home.  She was instructed that if she does experience any recurrent symptoms then she should stop her home medications and take steroid (was prescribed extra pills) and Benadryl and contact her primary physician.  C1 esterase was collected on the day of discharge and should be followed by her primary care physician.    Otherwise I will prescribe her 4 more days of prednisone 40 mg (prescribed extra to use in case of recurrence of swelling)    KINGS screening:  She was screened for sleep apnea due to her obesity in addition to diabetes and essential hypertension.  She did endorse loud snoring which disturbs her family and can be heard from a different room.  She does awaken herself snoring sometimes.  She reported occasional choking.  No reports of apnea.  She feels tired throughout the day and also wake up multiple times at night to urinate and wakes up with a dry mouth and often with headache in the morning.  -Bedtime: 7:30 PM.  -Sleep latency: Several hours.  -Wake up time 5 AM.  Does not feel refreshed.    She did endorse dosing of during the day sometimes when things are quiet and taking a nap.    I discussed with her the probability of sleep apnea and we  talked about testing and treatment with CPAP.  She is agreeable to get evaluated.  We will get a home sleep apnea testing and proceed with therapy if she test positive.  We also discussed the situation in between KINGS and metabolic syndrome including diabetes, HTN and dyslipidemia.      Consults:   IP CONSULT TO PULMONOLOGY    Significant Discharge Diagnostics   Procedures Performed:         Pertinent Lab Results:  Results from last 7 days   Lab Units 10/14/21  0747 10/13/21  2348   SODIUM mmol/L 139 139   POTASSIUM mmol/L 4.2 3.7   CHLORIDE mmol/L 105 99   CO2 mmol/L 18.8* 24.5   BUN mg/dL 11 11   CREATININE mg/dL 0.59 0.79   GLUCOSE mg/dL 310* 362*   CALCIUM mg/dL 8.4* 9.4         Results from last 7 days   Lab Units 10/13/21  2348   WBC 10*3/mm3 13.55*   HEMOGLOBIN g/dL 12.1   HEMATOCRIT % 37.3   PLATELETS 10*3/mm3 284   MCV fL 81.6   MCH pg 26.5*   MCHC g/dL 32.4   RDW % 14.4   RDW-SD fl 41.7   MPV fL 10.7   NEUTROPHIL % % 87.7*   LYMPHOCYTE % % 9.7*   MONOCYTES % % 1.5*   EOSINOPHIL % % 0.5   BASOPHIL % % 0.2   IMM GRAN % % 0.4   NEUTROS ABS 10*3/mm3 11.86*   LYMPHS ABS 10*3/mm3 1.32   MONOS ABS 10*3/mm3 0.21   EOS ABS 10*3/mm3 0.07   BASOS ABS 10*3/mm3 0.03   IMMATURE GRANS (ABS) 10*3/mm3 0.06*   NRBC /100 WBC 0.1           Imaging Results:  Imaging Results (All)     None          Objective:   Temp:  [97.4 °F (36.3 °C)-98.2 °F (36.8 °C)] 98.1 °F (36.7 °C)  Heart Rate:  [72-88] 72  Resp:  [16] 16  BP: (152-184)/(83-94) 158/89   SpO2:  [97 %-100 %] 100 %  on    Device (Oxygen Therapy): room air    Intake/Output Summary (Last 24 hours) at 10/15/2021 1656  Last data filed at 10/15/2021 0628  Gross per 24 hour   Intake 240 ml   Output --   Net 240 ml     Body mass index is 32.8 kg/m².      10/13/21  2355 10/15/21  0628   Weight: 87.8 kg (193 lb 9 oz) 86.7 kg (191 lb 1.6 oz)     Weight change: -1.118 kg (-2 lb 7.4 oz)    Physical Exam:      General: Alert, cooperative, in no acute distress.         HEENT:  Mallampati  4.  Moist tongue.  External ears normal.   Chest Wall:  No abnormalities observed.             Neck:  Trachea midline. No JVD.   Pulmonary:  CTAB. No wheezes. Respirations regular, even and unlabored.          Cardio:  Regular rhythm and normal rate. Normal S1 and S2. No murmur, gallop, rub or click.    Abdominal:  Soft, non-tender and non-distended. Normal bowel sounds. No masses. No organomegaly. No guarding. No rebound tenderness.  Extremities:  Moves all extremities well. No cyanosis. No redness. No edema.     Discharge Medications and Instructions:     Discharge Medications     Discharge Medications      New Medications      Instructions Start Date   diphenhydrAMINE 25 MG tablet  Commonly known as: Benadryl Allergy   25 mg, Oral, Every 8 Hours PRN      predniSONE 20 MG tablet  Commonly known as: DELTASONE   40 mg, Oral, Daily         Continue These Medications      Instructions Start Date   aspirin 81 MG chewable tablet   81 mg, Oral, Daily      atorvastatin 10 MG tablet  Commonly known as: LIPITOR   10 mg, Oral, Nightly      meclizine 12.5 MG tablet  Commonly known as: ANTIVERT   12.5 mg, Oral, 3 Times Daily PRN      metFORMIN 1000 MG tablet  Commonly known as: GLUCOPHAGE   1,000 mg, Oral, 2 Times Daily With Meals      verapamil  MG CR tablet  Commonly known as: CALAN-SR   240 mg, Oral, Nightly             Discharge Diet:    Dietary Orders (From admission, onward)     Start     Ordered    10/14/21 1203  Diet Regular; Consistent Carbohydrate, Cardiac  Diet Effective Now        Question Answer Comment   Diet Texture / Consistency Regular    Common Modifiers Consistent Carbohydrate    Common Modifiers Cardiac        10/14/21 1202                Activity at Discharge:   As tolerated  Discharge disposition: Home    Discharge Instructions and Follow ups:     Follow-up Information     Jenna Chu MD. Schedule an appointment as soon as possible for a visit in 1 week(s).    Specialty: Internal Medicine  Contact  information:  [address]  [address]  [facility] [address]  [phone]                       Future Appointments   Date Time Provider Department Center   11/2/2021 10:30 AM  [facility]        Medication Reconciliation: Please see electronically completed Med Rec.    Total time spent discharging patient including evaluation, medication reconciliation, arranging follow up, and post hospitalization instructions and education total time exceeds 30 minutes.     Jeffy Fowler MD  10/15/21  16:56 EDT      Dictated utilizing Dragon dictation

## 2021-10-15 NOTE — PLAN OF CARE
Goal Outcome Evaluation:              Outcome Summary: Pt AOx4.  Up ad south, currently up in chair.  SSI and Lantus given per order. Blood glucose in the low 200's.  No swelling noted.  H2O intake and mobility encouraged to promote BM.  Patient awaiting discharge.

## 2021-10-16 LAB — C1INH ACT/NOR SERPL: >91 %MEAN NORMAL

## 2021-10-18 LAB — C1INH SERPL-MCNC: 43 MG/DL (ref 21–39)

## 2021-11-02 ENCOUNTER — HOSPITAL ENCOUNTER (OUTPATIENT)
Dept: SLEEP MEDICINE | Facility: HOSPITAL | Age: 71
Discharge: HOME | End: 2021-11-02
Admitting: INTERNAL MEDICINE

## 2021-11-02 DIAGNOSIS — G47.33 OSA (OBSTRUCTIVE SLEEP APNEA): ICD-10-CM

## 2021-11-02 PROCEDURE — 95806 SLEEP STUDY UNATT&RESP EFFT: CPT

## 2021-11-13 DIAGNOSIS — G47.33 OSA (OBSTRUCTIVE SLEEP APNEA): Primary | ICD-10-CM

## 2021-11-30 ENCOUNTER — TELEPHONE (OUTPATIENT)
Dept: SLEEP MEDICINE | Facility: HOSPITAL | Age: 71
End: 2021-11-30

## 2021-11-30 NOTE — TELEPHONE ENCOUNTER
Spoke with patient about sleep study results , Pt chose [facility] as her DME , needs compliance follow up